# Patient Record
Sex: MALE | Race: WHITE | Employment: OTHER | URBAN - METROPOLITAN AREA
[De-identification: names, ages, dates, MRNs, and addresses within clinical notes are randomized per-mention and may not be internally consistent; named-entity substitution may affect disease eponyms.]

---

## 2017-10-16 ENCOUNTER — HOSPITAL ENCOUNTER (OUTPATIENT)
Dept: VASCULAR SURGERY | Age: 82
Discharge: HOME | End: 2017-10-16
Attending: INTERNAL MEDICINE
Payer: MEDICARE

## 2017-10-16 DIAGNOSIS — M79.89 LEG SWELLING: ICD-10-CM

## 2017-10-16 PROCEDURE — 93970 EXTREMITY STUDY: CPT

## 2017-10-16 NOTE — PROCEDURES
[address]  *** FINAL REPORT ***    Name: Sarah Alejandra  MRN: [id number]    Outpatient  : 1927  HIS Order #: [id number]  [id number] [facility] Visit #: [id number]  Date: 16 Oct 2017    TYPE OF TEST: Peripheral Venous Testing    REASON FOR TEST  Limb swelling    Right Leg:-  Deep venous thrombosis:           No  Superficial venous thrombosis:    No  Deep venous insufficiency:        Not examined  Superficial venous insufficiency: Not examined    Left Leg:-  Deep venous thrombosis:           No  Superficial venous thrombosis:    No  Deep venous insufficiency:        Not examined  Superficial venous insufficiency: Not examined      INTERPRETATION/FINDINGS  PROCEDURE:  Color duplex ultrasound imaging of lower extremity veins. FINDINGS:       Right: The common femoral, deep femoral, femoral, popliteal,  posterior tibial, peroneal, and great saphenous are patent and without   evidence of thrombus;  each is fully compressible and there is no  narrowing of the flow channel on color Doppler imaging. Phasic flow  is observed in the common femoral vein. Left:   The common femoral, deep femoral, femoral, popliteal,  posterior tibial, peroneal, and great saphenous are patent and without   evidence of thrombus;  each is fully compressible and there is no  narrowing of the flow channel on color Doppler imaging. Phasic flow  is observed in the common femoral vein. IMPRESSION:  No evidence of right or left lower extremity vein  thrombosis. ADDITIONAL COMMENTS    I have personally reviewed the data relevant to the interpretation of  this  study.     TECHNOLOGIST: LAYA Calle, RCS  Signed: 10/16/2017 05:09 PM    PHYSICIAN: Gume Ferreira MD  Signed: 10/17/2017 11:54 AM

## 2017-12-06 ENCOUNTER — HOSPITAL ENCOUNTER (INPATIENT)
Age: 82
LOS: 1 days | Discharge: NURSING, REHAB OR LONG-TERM CARE | DRG: 057 | End: 2017-12-09
Attending: EMERGENCY MEDICINE | Admitting: INTERNAL MEDICINE
Payer: MEDICARE

## 2017-12-06 ENCOUNTER — APPOINTMENT (OUTPATIENT)
Dept: CT IMAGING | Age: 82
DRG: 057 | End: 2017-12-06
Attending: EMERGENCY MEDICINE
Payer: MEDICARE

## 2017-12-06 DIAGNOSIS — G20 PRIMARY PARKINSONISM (HCC): ICD-10-CM

## 2017-12-06 DIAGNOSIS — R40.4 TRANSIENT ALTERATION OF AWARENESS: Primary | ICD-10-CM

## 2017-12-06 DIAGNOSIS — R41.89 UNRESPONSIVE EPISODE: ICD-10-CM

## 2017-12-06 LAB
ALBUMIN SERPL-MCNC: 3.5 G/DL (ref 3.5–5)
ALBUMIN/GLOB SERPL: 1 {RATIO} (ref 1.1–2.2)
ALP SERPL-CCNC: 100 U/L (ref 45–117)
ALT SERPL-CCNC: 25 U/L (ref 12–78)
ANION GAP SERPL CALC-SCNC: 7 MMOL/L (ref 5–15)
APPEARANCE UR: ABNORMAL
AST SERPL-CCNC: 25 U/L (ref 15–37)
ATRIAL RATE: 68 BPM
BACTERIA URNS QL MICRO: NEGATIVE /HPF
BASOPHILS # BLD: 0 K/UL (ref 0–0.1)
BASOPHILS NFR BLD: 0 % (ref 0–1)
BILIRUB SERPL-MCNC: 0.5 MG/DL (ref 0.2–1)
BILIRUB UR QL: NEGATIVE
BUN SERPL-MCNC: 22 MG/DL (ref 6–20)
BUN/CREAT SERPL: 18 (ref 12–20)
CALCIUM SERPL-MCNC: 9.2 MG/DL (ref 8.5–10.1)
CALCULATED P AXIS, ECG09: 38 DEGREES
CALCULATED R AXIS, ECG10: -19 DEGREES
CALCULATED T AXIS, ECG11: 29 DEGREES
CHLORIDE SERPL-SCNC: 99 MMOL/L (ref 97–108)
CO2 SERPL-SCNC: 28 MMOL/L (ref 21–32)
COLOR UR: ABNORMAL
CREAT SERPL-MCNC: 1.22 MG/DL (ref 0.7–1.3)
DIAGNOSIS, 93000: NORMAL
EOSINOPHIL # BLD: 0.1 K/UL (ref 0–0.4)
EOSINOPHIL NFR BLD: 1 % (ref 0–7)
EPITH CASTS URNS QL MICRO: ABNORMAL /LPF
ERYTHROCYTE [DISTWIDTH] IN BLOOD BY AUTOMATED COUNT: 13.7 % (ref 11.5–14.5)
GLOBULIN SER CALC-MCNC: 3.6 G/DL (ref 2–4)
GLUCOSE SERPL-MCNC: 104 MG/DL (ref 65–100)
GLUCOSE UR STRIP.AUTO-MCNC: NEGATIVE MG/DL
HCT VFR BLD AUTO: 42.3 % (ref 36.6–50.3)
HGB BLD-MCNC: 14.4 G/DL (ref 12.1–17)
HGB UR QL STRIP: NEGATIVE
KETONES UR QL STRIP.AUTO: NEGATIVE MG/DL
LEUKOCYTE ESTERASE UR QL STRIP.AUTO: NEGATIVE
LYMPHOCYTES # BLD: 3.1 K/UL (ref 0.8–3.5)
LYMPHOCYTES NFR BLD: 30 % (ref 12–49)
MCH RBC QN AUTO: 33.6 PG (ref 26–34)
MCHC RBC AUTO-ENTMCNC: 34 G/DL (ref 30–36.5)
MCV RBC AUTO: 98.8 FL (ref 80–99)
MONOCYTES # BLD: 0.6 K/UL (ref 0–1)
MONOCYTES NFR BLD: 6 % (ref 5–13)
NEUTS SEG # BLD: 6.4 K/UL (ref 1.8–8)
NEUTS SEG NFR BLD: 63 % (ref 32–75)
NITRITE UR QL STRIP.AUTO: NEGATIVE
P-R INTERVAL, ECG05: 158 MS
PH UR STRIP: 7 [PH] (ref 5–8)
PLATELET # BLD AUTO: 207 K/UL (ref 150–400)
POTASSIUM SERPL-SCNC: 4.4 MMOL/L (ref 3.5–5.1)
PROT SERPL-MCNC: 7.1 G/DL (ref 6.4–8.2)
PROT UR STRIP-MCNC: 30 MG/DL
Q-T INTERVAL, ECG07: 412 MS
QRS DURATION, ECG06: 90 MS
QTC CALCULATION (BEZET), ECG08: 438 MS
RBC # BLD AUTO: 4.28 M/UL (ref 4.1–5.7)
RBC #/AREA URNS HPF: ABNORMAL /HPF (ref 0–5)
SODIUM SERPL-SCNC: 134 MMOL/L (ref 136–145)
SP GR UR REFRACTOMETRY: 1.02 (ref 1–1.03)
TROPONIN I SERPL-MCNC: <0.04 NG/ML
TROPONIN I SERPL-MCNC: <0.04 NG/ML
UROBILINOGEN UR QL STRIP.AUTO: 0.2 EU/DL (ref 0.2–1)
VENTRICULAR RATE, ECG03: 68 BPM
WBC # BLD AUTO: 10.1 K/UL (ref 4.1–11.1)
WBC URNS QL MICRO: ABNORMAL /HPF (ref 0–4)

## 2017-12-06 PROCEDURE — 81001 URINALYSIS AUTO W/SCOPE: CPT | Performed by: EMERGENCY MEDICINE

## 2017-12-06 PROCEDURE — 36415 COLL VENOUS BLD VENIPUNCTURE: CPT | Performed by: EMERGENCY MEDICINE

## 2017-12-06 PROCEDURE — 84484 ASSAY OF TROPONIN QUANT: CPT | Performed by: EMERGENCY MEDICINE

## 2017-12-06 PROCEDURE — 96360 HYDRATION IV INFUSION INIT: CPT

## 2017-12-06 PROCEDURE — 85025 COMPLETE CBC W/AUTO DIFF WBC: CPT | Performed by: EMERGENCY MEDICINE

## 2017-12-06 PROCEDURE — 80053 COMPREHEN METABOLIC PANEL: CPT | Performed by: EMERGENCY MEDICINE

## 2017-12-06 PROCEDURE — 74011250636 HC RX REV CODE- 250/636: Performed by: INTERNAL MEDICINE

## 2017-12-06 PROCEDURE — 70450 CT HEAD/BRAIN W/O DYE: CPT

## 2017-12-06 PROCEDURE — 99218 HC RM OBSERVATION: CPT

## 2017-12-06 PROCEDURE — 99285 EMERGENCY DEPT VISIT HI MDM: CPT

## 2017-12-06 PROCEDURE — 93005 ELECTROCARDIOGRAM TRACING: CPT

## 2017-12-06 PROCEDURE — 93306 TTE W/DOPPLER COMPLETE: CPT

## 2017-12-06 PROCEDURE — 74011000250 HC RX REV CODE- 250: Performed by: INTERNAL MEDICINE

## 2017-12-06 PROCEDURE — 74011250637 HC RX REV CODE- 250/637: Performed by: INTERNAL MEDICINE

## 2017-12-06 PROCEDURE — 96361 HYDRATE IV INFUSION ADD-ON: CPT

## 2017-12-06 PROCEDURE — 95816 EEG AWAKE AND DROWSY: CPT | Performed by: INTERNAL MEDICINE

## 2017-12-06 RX ORDER — THERA TABS 400 MCG
1 TAB ORAL DAILY
Status: DISCONTINUED | OUTPATIENT
Start: 2017-12-07 | End: 2017-12-09 | Stop reason: HOSPADM

## 2017-12-06 RX ORDER — ACETAMINOPHEN 325 MG/1
650 TABLET ORAL
Status: DISCONTINUED | OUTPATIENT
Start: 2017-12-06 | End: 2017-12-09 | Stop reason: HOSPADM

## 2017-12-06 RX ORDER — POLYETHYLENE GLYCOL 3350 17 G/17G
17 POWDER, FOR SOLUTION ORAL
Status: DISCONTINUED | OUTPATIENT
Start: 2017-12-06 | End: 2017-12-09 | Stop reason: HOSPADM

## 2017-12-06 RX ORDER — ACETAMINOPHEN 325 MG/1
325 TABLET ORAL DAILY
Status: DISCONTINUED | OUTPATIENT
Start: 2017-12-07 | End: 2017-12-09 | Stop reason: HOSPADM

## 2017-12-06 RX ORDER — TIMOLOL MALEATE 5 MG/ML
1 SOLUTION/ DROPS OPHTHALMIC DAILY
Status: DISCONTINUED | OUTPATIENT
Start: 2017-12-07 | End: 2017-12-09 | Stop reason: HOSPADM

## 2017-12-06 RX ORDER — BUPROPION HYDROCHLORIDE 100 MG/1
100 TABLET, EXTENDED RELEASE ORAL 2 TIMES DAILY
Status: DISCONTINUED | OUTPATIENT
Start: 2017-12-06 | End: 2017-12-07

## 2017-12-06 RX ORDER — KETOCONAZOLE 20 MG/G
CREAM TOPICAL
COMMUNITY

## 2017-12-06 RX ORDER — METOPROLOL SUCCINATE 25 MG/1
25 TABLET, EXTENDED RELEASE ORAL DAILY
COMMUNITY

## 2017-12-06 RX ORDER — AZELASTINE 1 MG/ML
1 SPRAY, METERED NASAL 2 TIMES DAILY
Status: DISCONTINUED | OUTPATIENT
Start: 2017-12-06 | End: 2017-12-09 | Stop reason: HOSPADM

## 2017-12-06 RX ORDER — SODIUM CHLORIDE 9 MG/ML
100 INJECTION, SOLUTION INTRAVENOUS CONTINUOUS
Status: DISCONTINUED | OUTPATIENT
Start: 2017-12-06 | End: 2017-12-07

## 2017-12-06 RX ORDER — CLOPIDOGREL BISULFATE 75 MG/1
75 TABLET ORAL DAILY
Status: DISCONTINUED | OUTPATIENT
Start: 2017-12-07 | End: 2017-12-09 | Stop reason: HOSPADM

## 2017-12-06 RX ORDER — POLYETHYLENE GLYCOL 3350 17 G/17G
17 POWDER, FOR SOLUTION ORAL EVERY OTHER DAY
COMMUNITY
End: 2017-12-09

## 2017-12-06 RX ORDER — SODIUM CHLORIDE 0.9 % (FLUSH) 0.9 %
5-10 SYRINGE (ML) INJECTION AS NEEDED
Status: DISCONTINUED | OUTPATIENT
Start: 2017-12-06 | End: 2017-12-09 | Stop reason: HOSPADM

## 2017-12-06 RX ORDER — SODIUM CHLORIDE 0.9 % (FLUSH) 0.9 %
5-10 SYRINGE (ML) INJECTION EVERY 8 HOURS
Status: DISCONTINUED | OUTPATIENT
Start: 2017-12-06 | End: 2017-12-09 | Stop reason: HOSPADM

## 2017-12-06 RX ORDER — LATANOPROST 50 UG/ML
1 SOLUTION/ DROPS OPHTHALMIC
Status: DISCONTINUED | OUTPATIENT
Start: 2017-12-06 | End: 2017-12-09 | Stop reason: HOSPADM

## 2017-12-06 RX ORDER — METOPROLOL TARTRATE 25 MG/1
25 TABLET, FILM COATED ORAL
Status: DISCONTINUED | OUTPATIENT
Start: 2017-12-06 | End: 2017-12-06

## 2017-12-06 RX ORDER — LOPERAMIDE HYDROCHLORIDE 2 MG/1
2 CAPSULE ORAL
COMMUNITY

## 2017-12-06 RX ORDER — BUPROPION HYDROCHLORIDE 150 MG/1
150 TABLET ORAL
Status: DISCONTINUED | OUTPATIENT
Start: 2017-12-07 | End: 2017-12-06

## 2017-12-06 RX ORDER — HYDROCORTISONE 25 MG/G
CREAM TOPICAL
COMMUNITY

## 2017-12-06 RX ORDER — BUPROPION HYDROCHLORIDE 100 MG/1
100 TABLET, EXTENDED RELEASE ORAL 2 TIMES DAILY
COMMUNITY

## 2017-12-06 RX ORDER — GUAIFENESIN 600 MG/1
600 TABLET, EXTENDED RELEASE ORAL
Status: DISCONTINUED | OUTPATIENT
Start: 2017-12-06 | End: 2017-12-09 | Stop reason: HOSPADM

## 2017-12-06 RX ORDER — NITROGLYCERIN 0.4 MG/1
0.4 TABLET SUBLINGUAL
Status: DISCONTINUED | OUTPATIENT
Start: 2017-12-06 | End: 2017-12-09 | Stop reason: HOSPADM

## 2017-12-06 RX ORDER — METOPROLOL SUCCINATE 25 MG/1
25 TABLET, EXTENDED RELEASE ORAL
Status: DISCONTINUED | OUTPATIENT
Start: 2017-12-06 | End: 2017-12-09 | Stop reason: HOSPADM

## 2017-12-06 RX ORDER — ADHESIVE BANDAGE
30 BANDAGE TOPICAL DAILY PRN
COMMUNITY

## 2017-12-06 RX ORDER — METOPROLOL SUCCINATE 25 MG/1
25 TABLET, EXTENDED RELEASE ORAL DAILY
Status: DISCONTINUED | OUTPATIENT
Start: 2017-12-07 | End: 2017-12-06

## 2017-12-06 RX ORDER — FAMOTIDINE 20 MG/1
20 TABLET, FILM COATED ORAL
COMMUNITY

## 2017-12-06 RX ORDER — DOXAZOSIN 1 MG/1
1 TABLET ORAL DAILY
Status: DISCONTINUED | OUTPATIENT
Start: 2017-12-07 | End: 2017-12-09 | Stop reason: HOSPADM

## 2017-12-06 RX ADMIN — METOPROLOL SUCCINATE 25 MG: 25 TABLET, EXTENDED RELEASE ORAL at 22:31

## 2017-12-06 RX ADMIN — Medication 10 ML: at 22:33

## 2017-12-06 RX ADMIN — SODIUM CHLORIDE 100 ML/HR: 900 INJECTION, SOLUTION INTRAVENOUS at 16:29

## 2017-12-06 RX ADMIN — LATANOPROST 1 DROP: 50 SOLUTION OPHTHALMIC at 22:31

## 2017-12-06 RX ADMIN — BUPROPION HYDROCHLORIDE 100 MG: 100 TABLET, FILM COATED, EXTENDED RELEASE ORAL at 22:31

## 2017-12-06 NOTE — IP AVS SNAPSHOT
[address] [facility] Amanuel Caro 13 
[phone] Patient: Irina Olvera 
MRN: [id number] FBP:3/21/4381 About your hospitalization You were admitted on:  December 6, 2017 You last received care in the:  [facility] 6S NEURO-SCI TELE You were discharged on:  December 9, 2017 Why you were hospitalized Your primary diagnosis was:  Not on File Your diagnoses also included: Altered Mental Status, Htn (Hypertension), Dnr (Do Not Resuscitate), Cad (Coronary Artery Disease), Unresponsive Episode, Primary Parkinsonism (Hcc), Syncope Things You Need To Do (next 8 weeks) Follow up with Emanuel Hernandez MD  
  
Phone:  [phone] Where:  [address] [phone] Discharge Orders None A check cathy indicates which time of day the medication should be taken. My Medications STOP taking these medications OTHER  
   
  
  
TAKE these medications as instructed Instructions Each Dose to Equal  
 Morning Noon Evening Bedtime  
 acetaminophen 325 mg tablet Commonly known as:  TYLENOL Your last dose was: Your next dose is: Take 1,000 mg by mouth every eight (8) hours as needed. 1000 mg  
    
   
   
   
  
 amoxicillin 500 mg capsule Commonly known as:  AMOXIL Your last dose was: Your next dose is: Take 2,000 mg by mouth as needed. 1 hour prior to dental work  
 2000 mg Azelastine 0.15 % (205.5 mcg) nasal spray Commonly known as:  ASTEPRO Your last dose was: Your next dose is:    
   
   
 1 Spray by Right Nostril route two (2) times daily as needed. 1 Spray buPROPion  mg SR tablet Commonly known as:  Forrestine [place] Your last dose was: Your next dose is: Take 100 mg by mouth two (2) times a day.   
 100 mg  
    
   
   
   
  
 carbidopa-levodopa  mg per tablet Commonly known as:  SINEMET Your last dose was: Your next dose is: Take 1 Tab by mouth three (3) times daily. 1 Tab CENTRUM SILVER PO Your last dose was: Your next dose is: Take 1 Tab by mouth daily. 1 Tab  
    
   
   
   
  
 clopidogrel 75 mg Tab Commonly known as:  PLAVIX Your last dose was: Your next dose is: Take 1 Tab by mouth daily. 75 mg  
    
   
   
   
  
 diclofenac 1 % Gel Commonly known as:  VOLTAREN Your last dose was: Your next dose is:    
   
   
 Apply 4 g to affected area four (4) times daily as needed. Apply to both shoulders 4 g  
    
   
   
   
  
 docusate sodium 100 mg capsule Commonly known as:  Flor Mehta Your last dose was: Your next dose is: Take 1 Cap by mouth two (2) times a day for 90 days. 100 mg  
    
   
   
   
  
 doxazosin 1 mg tablet Commonly known as:  CARDURA Your last dose was: Your next dose is: Take 1 mg by mouth daily. 1 mg  
    
   
   
   
  
 hydrocortisone 2.5 % topical cream  
Commonly known as:  HYTONE Your last dose was: Your next dose is:    
   
   
 Apply  to affected area daily as needed. use thin layer  
     
   
   
   
  
 ketoconazole 2 % topical cream  
Commonly known as:  NIZORAL Your last dose was: Your next dose is:    
   
   
 Apply  to affected area two (2) times daily as needed for Skin Irritation. L-Methylfolate-B30-Suignjtghm tablet Commonly known as:  Orysia Mangle Your last dose was: Your next dose is: Take 1 Tab by mouth daily. 1 Tab LIMBREL 500 mg Cap Generic drug:  baicalin-catechin Your last dose was: Your next dose is: Take 1 Tab by mouth two (2) times a day. 1 Tab loperamide 2 mg capsule Commonly known as:  IMODIUM Your last dose was: Your next dose is: Take 2 mg by mouth four (4) times daily as needed for Diarrhea.  
 2 mg MIRALAX 17 gram/dose powder Generic drug:  polyethylene glycol Your last dose was: Your next dose is: Take 17 g by mouth daily as needed. 17 g MUCINEX 1,200 mg Ta12 ER tablet Generic drug:  guaiFENesin Your last dose was: Your next dose is: Take 600 mg by mouth every twelve (12) hours as needed. 600 mg MYRBETRIQ 25 mg ER tablet Generic drug:  mirabegron ER Your last dose was: Your next dose is: Take 25 mg by mouth nightly. 25 mg  
    
   
   
   
  
 nitroglycerin 0.4 mg SL tablet Commonly known as:  NITROSTAT Your last dose was: Your next dose is:    
   
   
 by SubLINGual route every five (5) minutes as needed. PEPCID 20 mg tablet Generic drug:  famotidine Your last dose was: Your next dose is: Take 20 mg by mouth two (2) times daily as needed. 20 mg PANDA MILK OF MAGNESIA 400 mg/5 mL suspension Generic drug:  magnesium hydroxide Your last dose was: Your next dose is: Take 30 mL by mouth daily as needed for Constipation. 30 mL  
    
   
   
   
  
 timolol 0.5 % ophthalmic solution Commonly known as:  TIMOPTIC Your last dose was: Your next dose is:    
   
   
 Administer 1 Drop to left eye daily. 1 Drop TOPROL XL 25 mg XL tablet Generic drug:  metoprolol succinate Your last dose was: Your next dose is: Take 25 mg by mouth daily. 25 mg  
    
   
   
   
  
 XALATAN 0.005 % ophthalmic solution Generic drug:  latanoprost  
   
 Your last dose was: Your next dose is:    
   
   
 Administer 1 Drop to left eye nightly. 1 Drop Where to Get Your Medications Information on where to get these meds will be given to you by the nurse or doctor. ! Ask your nurse or doctor about these medications  
  carbidopa-levodopa  mg per tablet  
 docusate sodium 100 mg capsule Discharge Instructions Patient Discharge Instructions Sandra Tan / [id number] : 1927 Admitted 2017 Discharged: 2017 Take Home Medications · It is important that you take the medication exactly as they are prescribed. · Keep your medication in the bottles provided by the pharmacist and keep a list of the medication names, dosages, and times to be taken in your wallet. · Do not take other medications without consulting your doctor. What to do at [facility] Recommended diet: Cardiac Diet, Recommended activity: Activity as tolerated, PT/OT If you experience any of the following symptoms syncope, please follow up with Dr. Nate Perez. Follow-up Appointments Procedures  FOLLOW UP VISIT Appointment in: One Month As needed As needed Standing Status:   Standing Number of Occurrences:   1 Order Specific Question:   Appointment in Answer:   One Month Information obtained by : 
I understand that if any problems occur once I am at home I am to contact my physician. I understand and acknowledge receipt of the instructions indicated above. Physician's or R.N.'s Signature                                                                  Date/Time Patient or Representative Signature                                                          Date/Time Introducing [facility]! Dear Khalif Mnea: Thank you for requesting a "[employer] account. Our records indicate that you already have an active "[employer] account. You can access your account anytime at [email or web address] Did you know that you can access your hospital and ER discharge instructions at any time in "[employer]? You can also review all of your test results from your hospital stay or ER visit. Additional Information If you have questions, please visit the Frequently Asked Questions section of the "[employer] website at [email or web address] Remember, "[employer] is NOT to be used for urgent needs. For medical emergencies, dial 911. Now available from your iPhone and Android! Providers Seen During Your Hospitalization Provider Specialty Primary office phone Clelia Sacks, MD Emergency Medicine [phone] Rosalva Marie MD Internal Medicine [phone] Your Primary Care Physician (PCP) Primary Care Physician Office Phone Office Fax [address] [phone] You are allergic to the following Allergen Reactions Latex Rash Actonel (Risedronate) Unknown (comments) Augmentin (Amoxicillin-Pot Clavulanate) Diarrhea Azithromycin Hives Crestor (Rosuvastatin) Myalgia Lescol (Fluvastatin) Myalgia Micardis (Telmisartan) Unknown (comments) Morphine Nausea and Vomiting Prolia (Denosumab) Other (comments) Dental difficulties Sulfa (Sulfonamide Antibiotics) Unknown (comments) Zocor (Simvastatin) Myalgia Recent Documentation Height Weight BMI Smoking Status 1.676 m 83.9 kg 29.84 kg/m2 Former Smoker Emergency Contacts Name Discharge Info Relation Home Work Mobile [address] CAREGIVER [3] Child [2] [phone] KimberlynJohn DISCHARGE CAREGIVER [3] Child [2] [phone] CAREGIVER [3] Girlfriend [18] [phone] Patient Belongings The following personal items are in your possession at time of discharge: 
  Dental Appliances: None  Visual Aid: Glasses, At home      Home Medications: Kept at bedside (verified by pharmacy)   Jewelry: None  Clothing: At bedside    Other Valuables: None Please provide this summary of care documentation to your next provider. Signatures-by signing, you are acknowledging that this After Visit Summary has been reviewed with you and you have received a copy. Patient Signature:  ____________________________________________________________ Date:  ____________________________________________________________  
  
[facility] Provider Signature:  ____________________________________________________________ Date:  ____________________________________________________________

## 2017-12-06 NOTE — ED TRIAGE NOTES
Pt arrives with altered mentation since daughter saw him on Monday. Pt has episode this morning after waking up where he \"zoned out\" in the bathroom when morning staff came in to check on pt. Pt is A&O x 3-4 upon arrival to ED.  per EMS.

## 2017-12-06 NOTE — CONSULTS
[address]   [address] [facility], [address] [place] [address]       Name:  Evorn Frankel   MR#:  [id number]   :  1927   Account #:  [de-identified]    Date of Consultation:  2017   Date of Adm:  2017       HISTORY OF PRESENT ILLNESS: This 20-year-old man recently   moved from assisted living into Healthcare at [facility]. He was sent to the ER for evaluation. He was found by staff slumped   over his Rollator in the bathroom around 8 this morning, with no   clothes on. Blood pressure was 70/50. He had altered mental status. He was then dressed, placed in a chair, felt well enough to have   breakfast. Family had arrived, and he was sent to the ER for   evaluation. He has been stable from a blood pressure standpoint here. He is alert, has interactive speech, but family notes that he is not at his   normal baseline. There have apparently been no fevers or chills. No   urinary symptoms. Urinalysis was negative. He had a CT scan of the   head without contrast, there was no acute intracranial abnormality. Chest x-ray is pending. His other laboratories are unremarkable. There   is no chest pain, no shortness of breath, no orthopnea, PND or edema. There was apparently no actual syncope. Not clear why the blood   pressure was low at that point, and he is unable to give an adequate   history of the event. PAST MEDICAL HISTORY: Normally followed by Dr. Medina King from a   cardiac standpoint. There is a prior history of coronary bypass grafting. There are some issues chronically with thrombocytopenia. He has had   previous syncope and TIA, anxiety disorder, prior pneumonias, long-  standing peripheral neuropathy, overactive bladder, osteoporosis,   hyponatremia, hypertension, degenerative arthritis with bilateral knee   replacements and hip arthritis, glaucoma, gastroesophageal reflux, and   a bypass surgery was 22 years ago.  He has a murmur that Dr. Medina King is following, sounds like it is aortic stenosis, which will be described. CURRENT MEDICINES   1. Tylenol p.r.n.   2. Amoxicillin as needed for dental work. 3. Astepro nasal spray. 4. Limbrel twice daily. 5. Wellbutrin  twice a day. 6. Plavix 75 mg a day. 7. Voltaren topical gel. 8. Doxazosin 1 mg daily. 9. Pepcid 20 twice daily as needed. 10. Centrum Silver. 11. Mucinex. 12. Hytone topical cream p.r.n.   13. Hydrocortisone cream p.r.n. 14. Ketaconazole p.r.n.   15. Cerefolin tablet daily. 16. Xalatan eye drops. 17. Loperamide p.r.n. 18. Milk of Magnesia p.r.n.   19. Toprol XL 25 mg a day. 20. Myrbetriq 25 mg nightly. 21. Nitroglycerin 0.4 sublingual p.r.n.   22. MiraLax every other day. 23. Timoptic eye drops. ALLERGIES   1. LATEX. 2. ACTONEL. 3. AUGMENTIN. 4. AZITHROMYCIN. 5. CRESTOR. 6. LESCOL. 7. MICARDIS. 8. MORPHINE. [place]. 10. SULFA. 11. SIMVASTATIN. FAMILY HISTORY: Positive for organic heart disease, stroke and   diabetes. SOCIAL HISTORY: Retired , here with family. No   cigarettes, no alcohol. Again, recently went from assisted living into   Healthcare at [facility]. He is approaching is 89st   birthday. REVIEW OF SYSTEMS: Otherwise unobtainable. PHYSICAL EXAMINATION   GENERAL: He is now in no acute distress. Family indicates that he is   not at his baseline in terms of mentation, but his speech is coherent. He has clear memory deficit. VITAL SIGNS: Blood pressure 164/81, pulse 80 and regular, sinus   rhythm. NECK: Sitting up no JVD. Carotids without bruit. Thyroid not palpable. No adenopathy. LUNGS: Clear. HEART: Regular rate and rhythm with a 2/6 systolic ejection murmur at   the base. I do not hear an S2. No other murmurs. ABDOMEN: Soft, nontender, without masses or organomegaly. EXTREMITIES: Without edema. Pedal pulses are diminished. SKIN: Intact.    MUSCULOSKELETAL: No skeletal deformities. NEUROLOGIC: Nonfocal.    EKG: Normal sinus rhythm, old septal infarct. LABORATORY DATA: Urinalysis negative. Sodium 134, potassium   4.4, chloride 99, CO2 28, glucose 104, CO2 22, creatinine 1.22. Troponins are negative x2. Hemoglobin 14.4, hematocrit 42.3, white   count 10,100, platelets 773,073. DIAGNOSTIC DATA: CT of the head, no intracranial abnormality. PROBLEMS   1. Altered mental status, with hypotensive episode of unclear etiology. 2. Coronary artery disease, with prior coronary bypass grafting. No   overt evidence for an ongoing ischemic syndrome. 3. Systolic ejection murmur at the base, consistent with aortic stenosis,   may be severe. 4. Dementia. 5. Degenerative joint disease, with history of bilateral knee   replacements and chronic hip pain. 6. History of hypertension. 7. Peripheral neuropathy. PLAN/RECOMMENDATIONS: Agree with need for admission and   observation. Get an updated echo, serial enzymes and EKGs. No   change in medical regimen at this point, and no aggressive cardiac   measures. We will follow with you.         Nino Louis MD      [address] [place] [id number] / [facility]   D:  12/06/2017   16:06   T:  12/06/2017   16:32   Job #:  [id number]

## 2017-12-06 NOTE — PROGRESS NOTES
Admission Medication Reconciliation:    Information obtained from: Medication list from [facility]    Significant PMH/Disease States:   Past Medical History:   Diagnosis Date    Arthritis     spine, hip    CAD (coronary artery disease)     bypass surgery 22 years ago,     Chronic pain     right hip    DNR (do not resuscitate) 2014    GERD (gastroesophageal reflux disease)     Glaucoma     Hip arthritis 2011    HTN (hypertension) 2011    Hyponatremia 2011    Osteoporosis     Overactive bladder     Peripheral neuropathy 2011    Pneumonia     Psychiatric disorder     anxiety    Syncope 2011    Thrombocytopenia ([facility] [address] 2013    Unspecified adverse effect of anesthesia     PT REQUESTS SPINAL ANESTHESIA , TO BE DISCUSSED WITH ANESTHESIOLOGIST DOS       Chief Complaint for this Admission:  AMS    Allergies:  Latex; Actonel [risedronate]; Augmentin [amoxicillin-pot clavulanate]; Azithromycin; Crestor [rosuvastatin]; Lescol [fluvastatin]; Micardis [telmisartan]; Morphine; Prolia [denosumab]; Sulfa (sulfonamide antibiotics); and Zocor [simvastatin]    Prior to Admission Medications:   Prior to Admission Medications   Prescriptions Last Dose Informant Patient Reported? Taking? Azelastine (ASTEPRO) 0.15 % (205.5 mcg) nasal spray   Yes Yes   Si Spray by Right Nostril route two (2) times daily as needed. FOLIC ACID/MULTIVIT-MIN/LUTEIN (CENTRUM SILVER PO) 2017 at Unknown time  Yes Yes   Sig: Take 1 Tab by mouth daily. L-Methylfolate-J94-Dnxwiapcla (CEREFOLIN) tablet 2017 at Unknown time  Yes Yes   Sig: Take 1 Tab by mouth daily. acetaminophen (TYLENOL) 325 mg tablet   Yes Yes   Sig: Take 1,000 mg by mouth every eight (8) hours as needed. amoxicillin (AMOXIL) 500 mg capsule   Yes No   Sig: Take 2,000 mg by mouth as needed.  1 hour prior to dental work    baicalin-catechin (LIMBREL) 500 mg cap 2017 at Unknown time  Yes Yes Sig: Take 1 Tab by mouth two (2) times a day. buPROPion SR (WELLBUTRIN SR) 100 mg SR tablet 12/6/2017 at Unknown time  Yes Yes   Sig: Take 100 mg by mouth two (2) times a day. clopidogrel (PLAVIX) 75 mg tablet 12/6/2017 at Unknown time  No Yes   Sig: Take 1 Tab by mouth daily. diclofenac (VOLTAREN) 1 % topical gel   No Yes   Sig: Apply 4 g to affected area four (4) times daily as needed. Apply to both shoulders   doxazosin (CARDURA) 1 mg tablet 12/6/2017 at Unknown time  Yes Yes   Sig: Take 1 mg by mouth daily. famotidine (PEPCID) 20 mg tablet   Yes Yes   Sig: Take 20 mg by mouth two (2) times daily as needed. guaiFENesin (MUCINEX) 1,200 mg Ta12 ER tablet   Yes Yes   Sig: Take 600 mg by mouth every twelve (12) hours as needed. hydrocortisone (HYTONE) 2.5 % topical cream   Yes Yes   Sig: Apply  to affected area daily as needed. use thin layer   ketoconazole (NIZORAL) 2 % topical cream   Yes Yes   Sig: Apply  to affected area two (2) times daily as needed for Skin Irritation. latanoprost (XALATAN) 0.005 % ophthalmic solution 12/5/2017 at Unknown time  Yes Yes   Sig: Administer 1 Drop to left eye nightly. loperamide (IMODIUM) 2 mg capsule   Yes Yes   Sig: Take 2 mg by mouth four (4) times daily as needed for Diarrhea.   magnesium hydroxide (PANDA MILK OF MAGNESIA) 400 mg/5 mL suspension   Yes Yes   Sig: Take 30 mL by mouth daily as needed for Constipation. metoprolol succinate (TOPROL XL) 25 mg XL tablet 12/6/2017 at Unknown time  Yes Yes   Sig: Take 25 mg by mouth daily. mirabegron ER (MYRBETRIQ) 25 mg ER tablet 12/5/2017 at Unknown time  Yes Yes   Sig: Take 25 mg by mouth nightly. nitroglycerin (NITROSTAT) 0.4 mg SL tablet   Yes Yes   Sig: by SubLINGual route every five (5) minutes as needed. polyethylene glycol (MIRALAX) 17 gram packet 12/6/2017 at Unknown time  Yes Yes   Sig: Take 17 g by mouth every other day.    polyethylene glycol (MIRALAX) 17 gram/dose powder   Yes Yes   Sig: Take 17 g by mouth daily as needed. timolol (TIMOPTIC) 0.5 % ophthalmic solution 12/6/2017 at Unknown time  Yes Yes   Sig: Administer 1 Drop to left eye daily. Facility-Administered Medications: None         Comments/Recommendations:   1. Changed bupropion XL 150mg daily to SR 150mg BID  2. Removed lidocaine, APAP CR  3. Changed APAP 325 to 1000mg Q8h PRN  4. Changed guaifenesin 1200mg QHS to 600mg BID PRN  5. Changed metoprolol tartrate to metoprolol succinate  6.  Added Miralax every other day, Cerefolin, loperamide, pepcid, milk of mag, ketoconazole, hydrocortisone

## 2017-12-06 NOTE — IP AVS SNAPSHOT
[address] 
[phone] Patient: Milton Holter 
MRN: [id number] CNU:3/71/0536 My Medications STOP taking these medications OTHER  
   
  
  
TAKE these medications as instructed Instructions Each Dose to Equal  
 Morning Noon Evening Bedtime  
 acetaminophen 325 mg tablet Commonly known as:  TYLENOL Your last dose was: Your next dose is: Take 1,000 mg by mouth every eight (8) hours as needed. 1000 mg  
    
   
   
   
  
 amoxicillin 500 mg capsule Commonly known as:  AMOXIL Your last dose was: Your next dose is: Take 2,000 mg by mouth as needed. 1 hour prior to dental work  
 2000 mg Azelastine 0.15 % (205.5 mcg) nasal spray Commonly known as:  ASTEPRO Your last dose was: Your next dose is:    
   
   
 1 Spray by Right Nostril route two (2) times daily as needed. 1 Spray buPROPion  mg SR tablet Commonly known as:  Solange Peres Your last dose was: Your next dose is: Take 100 mg by mouth two (2) times a day. 100 mg  
    
   
   
   
  
 carbidopa-levodopa  mg per tablet Commonly known as:  SINEMET Your last dose was: Your next dose is: Take 1 Tab by mouth three (3) times daily. 1 Tab CENTRUM SILVER PO Your last dose was: Your next dose is: Take 1 Tab by mouth daily. 1 Tab  
    
   
   
   
  
 clopidogrel 75 mg Tab Commonly known as:  PLAVIX Your last dose was: Your next dose is: Take 1 Tab by mouth daily. 75 mg  
    
   
   
   
  
 diclofenac 1 % Gel Commonly known as:  VOLTAREN Your last dose was: Your next dose is:    
   
   
 Apply 4 g to affected area four (4) times daily as needed. Apply to both shoulders 4 g  
    
   
   
   
  
 docusate sodium 100 mg capsule Commonly known as:  Jam Mccurdy Your last dose was: Your next dose is: Take 1 Cap by mouth two (2) times a day for 90 days. 100 mg  
    
   
   
   
  
 doxazosin 1 mg tablet Commonly known as:  CARDURA Your last dose was: Your next dose is: Take 1 mg by mouth daily. 1 mg  
    
   
   
   
  
 hydrocortisone 2.5 % topical cream  
Commonly known as:  HYTONE Your last dose was: Your next dose is:    
   
   
 Apply  to affected area daily as needed. use thin layer  
     
   
   
   
  
 ketoconazole 2 % topical cream  
Commonly known as:  NIZORAL Your last dose was: Your next dose is:    
   
   
 Apply  to affected area two (2) times daily as needed for Skin Irritation. L-Methylfolate-I39-Bgrbxghfrx tablet Commonly known as:  Mary Band Your last dose was: Your next dose is: Take 1 Tab by mouth daily. 1 Tab LIMBREL 500 mg Cap Generic drug:  baicalin-catechin Your last dose was: Your next dose is: Take 1 Tab by mouth two (2) times a day. 1 Tab  
    
   
   
   
  
 loperamide 2 mg capsule Commonly known as:  IMODIUM Your last dose was: Your next dose is: Take 2 mg by mouth four (4) times daily as needed for Diarrhea.  
 2 mg MIRALAX 17 gram/dose powder Generic drug:  polyethylene glycol Your last dose was: Your next dose is: Take 17 g by mouth daily as needed. 17 g MUCINEX 1,200 mg Ta12 ER tablet Generic drug:  guaiFENesin Your last dose was: Your next dose is: Take 600 mg by mouth every twelve (12) hours as needed. 600 mg MYRBETRIQ 25 mg ER tablet Generic drug:  mirabegron ER  
   
 Your last dose was: Your next dose is: Take 25 mg by mouth nightly. 25 mg  
    
   
   
   
  
 nitroglycerin 0.4 mg SL tablet Commonly known as:  NITROSTAT Your last dose was: Your next dose is:    
   
   
 by SubLINGual route every five (5) minutes as needed. PEPCID 20 mg tablet Generic drug:  famotidine Your last dose was: Your next dose is: Take 20 mg by mouth two (2) times daily as needed. 20 mg PANDA MILK OF MAGNESIA 400 mg/5 mL suspension Generic drug:  magnesium hydroxide Your last dose was: Your next dose is: Take 30 mL by mouth daily as needed for Constipation. 30 mL  
    
   
   
   
  
 timolol 0.5 % ophthalmic solution Commonly known as:  TIMOPTIC Your last dose was: Your next dose is:    
   
   
 Administer 1 Drop to left eye daily. 1 Drop TOPROL XL 25 mg XL tablet Generic drug:  metoprolol succinate Your last dose was: Your next dose is: Take 25 mg by mouth daily. 25 mg  
    
   
   
   
  
 XALATAN 0.005 % ophthalmic solution Generic drug:  latanoprost  
   
Your last dose was: Your next dose is:    
   
   
 Administer 1 Drop to left eye nightly. 1 Drop Where to Get Your Medications Information on where to get these meds will be given to you by the nurse or doctor. ! Ask your nurse or doctor about these medications  
  carbidopa-levodopa  mg per tablet  
 docusate sodium 100 mg capsule

## 2017-12-06 NOTE — ED PROVIDER NOTES
HPI Comments: The patient is a 77-year-old male who presents to the emergency room via EMS having been found altered at his assisted living facility. The patient recalls rising from bed this morning end going into the bathroom around 0800. He was found by staff slumped over in his rollater with no clothes on. Blood pressure at that time was 70/50. He was dressed and placed in a chair. He then had breakfast and all of his morning meds. The events he recalls. Upon the arrival of his daughter later on in the morning EMS was activated to bring the patient to the emergency room for further evaluation. There is been no documented fevers. Patient has no complaints now and is currently back to baseline. Pt denies fevers, chills, night sweats, chest pain, pressure, SOB, FARLEY, PND, orthopnea, abdominal pain, n/v/d, melena, hematuria, dysuria, constipation, HA, dizziness, and recent fall. Past Medical History:  No date: Arthritis      Comment: spine, hip  No date: CAD (coronary artery disease)      Comment: bypass surgery 22 years ago, 1988  No date: Chronic pain      Comment: right hip  11/26/2014: DNR (do not resuscitate)      Comment: 11/26/14  No date: GERD (gastroesophageal reflux disease)  No date: Glaucoma  12/5/2011: Hip arthritis  9/13/2011: HTN (hypertension)  11/28/2011: Hyponatremia  No date: Osteoporosis  No date: Overactive bladder  9/13/2011: Peripheral neuropathy  No date: Pneumonia  No date: Psychiatric disorder      Comment: anxiety  9/13/2011: Syncope  11/6/2013: Thrombocytopenia ([facility] [address]  No date: Unspecified adverse effect of anesthesia      Comment: PT REQUESTS SPINAL ANESTHESIA , TO BE                DISCUSSED WITH ANESTHESIOLOGIST DOS    Past Surgical History:  No date: ABDOMEN SURGERY PROC UNLISTED      Comment: spleona, ventral, left inguinal repairs.   1988: CARDIAC SURG PROCEDURE UNLIST      Comment: CABG  11/16/09: ENDOSCOPY, COLON, DIAGNOSTIC      Comment: diverticulosis, f/u prn  No date: HX COLONOSCOPY  No date: HX CORONARY ARTERY BYPASS GRAFT  2010: HX GI      Comment: Ventral abd, herniorrhaphy with mesh, Lt IH   No date: HX HEENT      Comment: both eyes 2009, cataract surgery  No date: HX LUMBAR LAMINECTOMY      Comment: for spinal stenosis  No date: HX ORTHOPAEDIC      Comment: back ,BILATERAL knee replacement, shoulder  No date: HX ORTHOPAEDIC      Comment: right shoulder decompression  No date: HX ORTHOPAEDIC      Comment: RT HIP  No date: HX OTHER SURGICAL      Comment: right carotid endarterectomy  No date: HX TONSILLECTOMY    PCP:  Claudy Miller MD      Patient is a 80 y.o. male presenting with altered mental status. The history is provided by the patient. Altered mental status    Pertinent negatives include no confusion, no weakness, no agitation and no numbness. Past Medical History:   Diagnosis Date    Arthritis     spine, hip    CAD (coronary artery disease)     bypass surgery 22 years ago, 1988    Chronic pain     right hip    DNR (do not resuscitate) 11/26/2014 11/26/14    GERD (gastroesophageal reflux disease)     Glaucoma     Hip arthritis 12/5/2011    HTN (hypertension) 9/13/2011    Hyponatremia 11/28/2011    Osteoporosis     Overactive bladder     Peripheral neuropathy 9/13/2011    Pneumonia     Psychiatric disorder     anxiety    Syncope 9/13/2011    Thrombocytopenia ([facility] [address] 11/6/2013    Unspecified adverse effect of anesthesia     PT REQUESTS SPINAL ANESTHESIA , TO BE DISCUSSED WITH ANESTHESIOLOGIST DOS       Past Surgical History:   Procedure Laterality Date    ABDOMEN SURGERY PROC UNLISTED      spgellian, ventral, left inguinal repairs.     CARDIAC SURG PROCEDURE UNLIST  1988    CABG    ENDOSCOPY, COLON, DIAGNOSTIC  11/16/09    diverticulosis, f/u prn    HX COLONOSCOPY      HX CORONARY ARTERY BYPASS GRAFT      HX GI  2010    Ventral abd, herniorrhaphy with mesh, Lt IH     HX HEENT      both eyes 2009, cataract surgery    HX LUMBAR LAMINECTOMY      for spinal stenosis    HX ORTHOPAEDIC      back ,BILATERAL knee replacement, shoulder    HX ORTHOPAEDIC      right shoulder decompression    HX ORTHOPAEDIC      RT HIP    HX OTHER SURGICAL      right carotid endarterectomy    HX TONSILLECTOMY           Family History:   Problem Relation Age of Onset    Diabetes Brother     Heart Disease Brother     Heart Disease Father     Stroke Father        Social History     Social History    Marital status:      Spouse name: N/A    Number of children: N/A    Years of education: N/A     Occupational History    Not on file. Social History Main Topics    Smoking status: Former Smoker    Smokeless tobacco: Never Used    Alcohol use 0.5 oz/week     1 Glasses of wine per week      Comment: infrequent     Drug use: No    Sexual activity: Not on file     Other Topics Concern    Not on file     Social History Narrative         ALLERGIES: Latex; Actonel [risedronate]; Augmentin [amoxicillin-pot clavulanate]; Azithromycin; Crestor [rosuvastatin]; Lescol [fluvastatin]; Micardis [telmisartan]; Morphine; Prolia [denosumab]; Sulfa (sulfonamide antibiotics); and Zocor [simvastatin]    Review of Systems   Constitutional: Negative for activity change, appetite change, chills, diaphoresis, fatigue, fever and unexpected weight change. HENT: Negative for congestion, ear pain, rhinorrhea, sinus pressure, sore throat, tinnitus, trouble swallowing and voice change. Eyes: Negative for pain, discharge, redness and visual disturbance. Respiratory: Negative for apnea, cough, choking, chest tightness, shortness of breath, wheezing and stridor. Cardiovascular: Negative for chest pain, palpitations and leg swelling. Gastrointestinal: Negative for abdominal pain, constipation, nausea and vomiting. Endocrine: Negative for cold intolerance and heat intolerance.    Genitourinary: Negative for difficulty urinating, dysuria, flank pain, hematuria, testicular pain and urgency. Musculoskeletal: Negative for arthralgias, back pain, gait problem, joint swelling, myalgias, neck pain and neck stiffness. Skin: Negative for color change, pallor, rash and wound. Allergic/Immunologic: Negative for immunocompromised state. Neurological: Negative for dizziness, tremors, syncope, weakness, light-headedness, numbness and headaches. Transient loss of memory of events from 0800 until 0900   Hematological: Does not bruise/bleed easily. Psychiatric/Behavioral: Negative for agitation, confusion and suicidal ideas. Vitals:    12/06/17 1129 12/06/17 1130 12/06/17 1230 12/06/17 1300   BP: 164/81 164/81  117/59   Pulse: 70 79 70 70   Resp: 18 16 17 17   Temp: 98.4 °F (36.9 °C)      SpO2: 99% 99% 98% 95%   Weight: 77.1 kg (170 lb)      Height: 5' 6\" (1.676 m)               Physical Exam   Constitutional: He is oriented to person, place, and time. He appears well-developed and well-nourished. No distress. HENT:   Head: Atraumatic. Nose: Nose normal.   Mouth/Throat: No oropharyngeal exudate. Eyes: Conjunctivae and EOM are normal. Right eye exhibits no discharge. Left eye exhibits no discharge. No scleral icterus. Neck: Normal range of motion. Neck supple. No JVD present. No tracheal deviation present. No thyromegaly present. Cardiovascular: Normal rate and regular rhythm. Exam reveals no gallop and no friction rub. No murmur heard. Pulmonary/Chest: Breath sounds normal. No accessory muscle usage or stridor. No respiratory distress. He has no decreased breath sounds. He has no wheezes. He has no rhonchi. He has no rales. He exhibits no tenderness. Abdominal: Soft. Bowel sounds are normal. He exhibits no distension and no mass. There is no hepatosplenomegaly. There is no tenderness. There is no rigidity, no rebound, no guarding, no CVA tenderness, no tenderness at McBurney's point and negative Campos's sign.    Musculoskeletal: Normal range of motion. He exhibits no edema or tenderness. Lymphadenopathy:     He has no cervical adenopathy. Neurological: He is alert and oriented to person, place, and time. He has normal strength. No cranial nerve deficit or sensory deficit. He displays a negative Romberg sign. Coordination normal. GCS eye subscore is 4. GCS verbal subscore is 5. GCS motor subscore is 6. Skin: Skin is warm and dry. He is not diaphoretic. Psychiatric: He has a normal mood and affect. His behavior is normal.   Nursing note and vitals reviewed. MDM  Number of Diagnoses or Management Options  Diagnosis management comments:    * routine laboratory data and UA   * CT head   * Consult to PCP    ED Course       Procedures        CONSULT NOTE:   2:11 PM  Maricel Villanueva NP spoke with Dr. Porsche Fishman MD,   Specialty: Primary Care Physician   Discussed pt's hx, disposition, and available diagnostic and imaging results. Reviewed care plans. Consultant agrees with plans as outlined. MD to admit pt for further work up and evaluation of transient unawareness with possible syncope, seizure, or TIA. Maricel Villanueva NP      2:12 PM  Patient is being admitted to the hospital.  The results of their tests and reasons for their admission have been discussed with them and/or available family. They convey agreement and understanding for the need to be admitted and for their admission diagnosis. Consultation has been made with the inpatient physician specialist for hospitalization.     LABORATORY TESTS:  Recent Results (from the past 12 hour(s))   CBC WITH AUTOMATED DIFF    Collection Time: 12/06/17 11:35 AM   Result Value Ref Range    WBC 10.1 4.1 - 11.1 K/uL    RBC 4.28 4.10 - 5.70 M/uL    HGB 14.4 12.1 - 17.0 g/dL    HCT 42.3 36.6 - 50.3 %    MCV 98.8 80.0 - 99.0 FL    MCH 33.6 26.0 - 34.0 PG    MCHC 34.0 30.0 - 36.5 g/dL    RDW 13.7 11.5 - 14.5 %    PLATELET 201 413 - 722 K/uL    NEUTROPHILS 63 32 - 75 %    LYMPHOCYTES 30 12 - 49 % MONOCYTES 6 5 - 13 %    EOSINOPHILS 1 0 - 7 %    BASOPHILS 0 0 - 1 %    ABS. NEUTROPHILS 6.4 1.8 - 8.0 K/UL    ABS. LYMPHOCYTES 3.1 0.8 - 3.5 K/UL    ABS. MONOCYTES 0.6 0.0 - 1.0 K/UL    ABS. EOSINOPHILS 0.1 0.0 - 0.4 K/UL    ABS. BASOPHILS 0.0 0.0 - 0.1 K/UL   METABOLIC PANEL, COMPREHENSIVE    Collection Time: 12/06/17 11:35 AM   Result Value Ref Range    Sodium 134 (L) 136 - 145 mmol/L    Potassium 4.4 3.5 - 5.1 mmol/L    Chloride 99 97 - 108 mmol/L    CO2 28 21 - 32 mmol/L    Anion gap 7 5 - 15 mmol/L    Glucose 104 (H) 65 - 100 mg/dL    BUN 22 (H) 6 - 20 MG/DL    Creatinine 1.22 0.70 - 1.30 MG/DL    BUN/Creatinine ratio 18 12 - 20      GFR est AA >60 >60 ml/min/1.73m2    GFR est non-AA 56 (L) >60 ml/min/1.73m2    Calcium 9.2 8.5 - 10.1 MG/DL    Bilirubin, total 0.5 0.2 - 1.0 MG/DL    ALT (SGPT) 25 12 - 78 U/L    AST (SGOT) 25 15 - 37 U/L    Alk. phosphatase 100 45 - 117 U/L    Protein, total 7.1 6.4 - 8.2 g/dL    Albumin 3.5 3.5 - 5.0 g/dL    Globulin 3.6 2.0 - 4.0 g/dL    A-G Ratio 1.0 (L) 1.1 - 2.2     URINALYSIS W/MICROSCOPIC    Collection Time: 12/06/17 11:35 AM   Result Value Ref Range    Color YELLOW/STRAW      Appearance CLOUDY (A) CLEAR      Specific gravity 1.018 1.003 - 1.030      pH (UA) 7.0 5.0 - 8.0      Protein 30 (A) NEG mg/dL    Glucose NEGATIVE  NEG mg/dL    Ketone NEGATIVE  NEG mg/dL    Bilirubin NEGATIVE  NEG      Blood NEGATIVE  NEG      Urobilinogen 0.2 0.2 - 1.0 EU/dL    Nitrites NEGATIVE  NEG      Leukocyte Esterase NEGATIVE  NEG      WBC 0-4 0 - 4 /hpf    RBC 0-5 0 - 5 /hpf    Epithelial cells FEW FEW /lpf    Bacteria NEGATIVE  NEG /hpf   TROPONIN I    Collection Time: 12/06/17 11:35 AM   Result Value Ref Range    Troponin-I, Qt. <0.04 <0.05 ng/mL       IMAGING RESULTS:  CT HEAD WO CONT   Final Result        Ct Head Wo Cont    Result Date: 12/6/2017  EXAM:  CT head without contrast INDICATION: Altered mental status.  COMPARISON: CT head 6/22/2015 TECHNIQUE: Axial noncontrast head CT from foramen magnum to vertex. Coronal and sagittal reformatted images were obtained. CT dose reduction was achieved through use of a standardized protocol tailored for this examination and automatic exposure control for dose modulation. FINDINGS:  There is diffuse age-related parenchymal volume loss. The ventricles and sulci are age-appropriate without hydrocephalus. There is no mass effect or midline shift. There is no intracranial hemorrhage or extra-axial fluid collection. Areas of low attenuation in the periventricular white matter most represent stable chronic microvascular ischemic changes. There is no new abnormal parenchymal attenuation. The gray-white matter differentiation is maintained. The basal cisterns are patent. The osseous structures are intact. The visualized paranasal sinuses and mastoid air cells are clear. IMPRESSION: There is no acute intracranial abnormality. MEDICATIONS GIVEN:  Medications - No data to display    IMPRESSION:  1. Transient alteration of awareness        PLAN:  1.  Admit to Internal Medicine, Dr. Ben Estrada NP  2:12 PM

## 2017-12-06 NOTE — H&P
PCP brief note. Found in bathroom with sbp 70 and decreased mentation. Now NP says pt is doing well in ER . Will observe in hospital and ask Cardiology to see. outpt Cardiology is Hector Wong who does not practice at [facility].  I have entered orders and will see later today

## 2017-12-07 ENCOUNTER — APPOINTMENT (OUTPATIENT)
Dept: MRI IMAGING | Age: 82
DRG: 057 | End: 2017-12-07
Attending: INTERNAL MEDICINE
Payer: MEDICARE

## 2017-12-07 PROBLEM — R41.82 ALTERED MENTAL STATUS: Status: ACTIVE | Noted: 2017-12-07

## 2017-12-07 PROBLEM — G20 PRIMARY PARKINSONISM (HCC): Status: ACTIVE | Noted: 2017-12-07

## 2017-12-07 PROBLEM — R41.89 UNRESPONSIVE EPISODE: Status: ACTIVE | Noted: 2017-12-07

## 2017-12-07 LAB
ANION GAP SERPL CALC-SCNC: 7 MMOL/L (ref 5–15)
BUN SERPL-MCNC: 16 MG/DL (ref 6–20)
BUN/CREAT SERPL: 17 (ref 12–20)
CALCIUM SERPL-MCNC: 8.8 MG/DL (ref 8.5–10.1)
CHLORIDE SERPL-SCNC: 104 MMOL/L (ref 97–108)
CK MB CFR SERPL CALC: 2.1 % (ref 0–2.5)
CK MB SERPL-MCNC: 1.3 NG/ML (ref 5–25)
CK SERPL-CCNC: 62 U/L (ref 39–308)
CO2 SERPL-SCNC: 25 MMOL/L (ref 21–32)
CREAT SERPL-MCNC: 0.93 MG/DL (ref 0.7–1.3)
ERYTHROCYTE [DISTWIDTH] IN BLOOD BY AUTOMATED COUNT: 13.4 % (ref 11.5–14.5)
GLUCOSE SERPL-MCNC: 102 MG/DL (ref 65–100)
HCT VFR BLD AUTO: 39.1 % (ref 36.6–50.3)
HGB BLD-MCNC: 13.4 G/DL (ref 12.1–17)
MAGNESIUM SERPL-MCNC: 2.2 MG/DL (ref 1.6–2.4)
MCH RBC QN AUTO: 33.5 PG (ref 26–34)
MCHC RBC AUTO-ENTMCNC: 34.3 G/DL (ref 30–36.5)
MCV RBC AUTO: 97.8 FL (ref 80–99)
PLATELET # BLD AUTO: 186 K/UL (ref 150–400)
POTASSIUM SERPL-SCNC: 4.1 MMOL/L (ref 3.5–5.1)
RBC # BLD AUTO: 4 M/UL (ref 4.1–5.7)
SODIUM SERPL-SCNC: 136 MMOL/L (ref 136–145)
T4 FREE SERPL-MCNC: 1.3 NG/DL (ref 0.8–1.5)
TROPONIN I SERPL-MCNC: <0.04 NG/ML
TSH SERPL DL<=0.05 MIU/L-ACNC: 1.31 UIU/ML (ref 0.36–3.74)
WBC # BLD AUTO: 7.6 K/UL (ref 4.1–11.1)

## 2017-12-07 PROCEDURE — 70544 MR ANGIOGRAPHY HEAD W/O DYE: CPT

## 2017-12-07 PROCEDURE — 74011250637 HC RX REV CODE- 250/637: Performed by: INTERNAL MEDICINE

## 2017-12-07 PROCEDURE — 80048 BASIC METABOLIC PNL TOTAL CA: CPT | Performed by: INTERNAL MEDICINE

## 2017-12-07 PROCEDURE — 85027 COMPLETE CBC AUTOMATED: CPT | Performed by: INTERNAL MEDICINE

## 2017-12-07 PROCEDURE — 84484 ASSAY OF TROPONIN QUANT: CPT | Performed by: INTERNAL MEDICINE

## 2017-12-07 PROCEDURE — 70548 MR ANGIOGRAPHY NECK W/DYE: CPT

## 2017-12-07 PROCEDURE — 83735 ASSAY OF MAGNESIUM: CPT | Performed by: INTERNAL MEDICINE

## 2017-12-07 PROCEDURE — A9577 INJ MULTIHANCE: HCPCS | Performed by: INTERNAL MEDICINE

## 2017-12-07 PROCEDURE — 70553 MRI BRAIN STEM W/O & W/DYE: CPT

## 2017-12-07 PROCEDURE — 84439 ASSAY OF FREE THYROXINE: CPT | Performed by: INTERNAL MEDICINE

## 2017-12-07 PROCEDURE — 99218 HC RM OBSERVATION: CPT

## 2017-12-07 PROCEDURE — 77030012893

## 2017-12-07 PROCEDURE — 82550 ASSAY OF CK (CPK): CPT | Performed by: INTERNAL MEDICINE

## 2017-12-07 PROCEDURE — 36415 COLL VENOUS BLD VENIPUNCTURE: CPT | Performed by: INTERNAL MEDICINE

## 2017-12-07 PROCEDURE — 74011250636 HC RX REV CODE- 250/636: Performed by: INTERNAL MEDICINE

## 2017-12-07 PROCEDURE — 84443 ASSAY THYROID STIM HORMONE: CPT | Performed by: INTERNAL MEDICINE

## 2017-12-07 PROCEDURE — 74011000250 HC RX REV CODE- 250: Performed by: INTERNAL MEDICINE

## 2017-12-07 PROCEDURE — 74011250637 HC RX REV CODE- 250/637: Performed by: PSYCHIATRY & NEUROLOGY

## 2017-12-07 PROCEDURE — 74011000258 HC RX REV CODE- 258: Performed by: INTERNAL MEDICINE

## 2017-12-07 RX ORDER — BUPROPION HYDROCHLORIDE 100 MG/1
100 TABLET, EXTENDED RELEASE ORAL DAILY
Status: DISCONTINUED | OUTPATIENT
Start: 2017-12-07 | End: 2017-12-09 | Stop reason: HOSPADM

## 2017-12-07 RX ORDER — BUPROPION HYDROCHLORIDE 100 MG/1
50 TABLET ORAL
Status: DISCONTINUED | OUTPATIENT
Start: 2017-12-07 | End: 2017-12-09 | Stop reason: HOSPADM

## 2017-12-07 RX ORDER — CARBIDOPA AND LEVODOPA 10; 100 MG/1; MG/1
1 TABLET ORAL 3 TIMES DAILY
Status: DISCONTINUED | OUTPATIENT
Start: 2017-12-07 | End: 2017-12-09 | Stop reason: HOSPADM

## 2017-12-07 RX ORDER — FAMOTIDINE 20 MG/1
20 TABLET, FILM COATED ORAL DAILY
Status: DISCONTINUED | OUTPATIENT
Start: 2017-12-07 | End: 2017-12-09 | Stop reason: HOSPADM

## 2017-12-07 RX ADMIN — ACETAMINOPHEN 650 MG: 325 TABLET ORAL at 02:04

## 2017-12-07 RX ADMIN — CARBIDOPA AND LEVODOPA 1 TABLET: 10; 100 TABLET ORAL at 17:54

## 2017-12-07 RX ADMIN — BUPROPION HYDROCHLORIDE 50 MG: 100 TABLET, FILM COATED ORAL at 17:54

## 2017-12-07 RX ADMIN — SODIUM CHLORIDE 100 ML/HR: 900 INJECTION, SOLUTION INTRAVENOUS at 18:00

## 2017-12-07 RX ADMIN — SODIUM CHLORIDE 100 ML/HR: 900 INJECTION, SOLUTION INTRAVENOUS at 01:54

## 2017-12-07 RX ADMIN — THERA TABS 1 TABLET: TAB at 09:50

## 2017-12-07 RX ADMIN — DOXAZOSIN 1 MG: 1 TABLET ORAL at 09:49

## 2017-12-07 RX ADMIN — AZELASTINE HYDROCHLORIDE 1 SPRAY: 137 SPRAY, METERED NASAL at 02:11

## 2017-12-07 RX ADMIN — SODIUM CHLORIDE 100 ML: 900 INJECTION, SOLUTION INTRAVENOUS at 12:40

## 2017-12-07 RX ADMIN — CLOPIDOGREL BISULFATE 75 MG: 75 TABLET ORAL at 09:51

## 2017-12-07 RX ADMIN — BUPROPION HYDROCHLORIDE 100 MG: 100 TABLET, FILM COATED, EXTENDED RELEASE ORAL at 09:48

## 2017-12-07 RX ADMIN — GADOBENATE DIMEGLUMINE 16 ML: 529 INJECTION, SOLUTION INTRAVENOUS at 12:40

## 2017-12-07 RX ADMIN — Medication 10 ML: at 14:01

## 2017-12-07 RX ADMIN — CARBIDOPA AND LEVODOPA 1 TABLET: 10; 100 TABLET ORAL at 22:26

## 2017-12-07 RX ADMIN — TIMOLOL MALEATE 1 DROP: 5 SOLUTION OPHTHALMIC at 13:00

## 2017-12-07 RX ADMIN — Medication 10 ML: at 06:34

## 2017-12-07 RX ADMIN — LATANOPROST 1 DROP: 50 SOLUTION OPHTHALMIC at 22:27

## 2017-12-07 RX ADMIN — METOPROLOL SUCCINATE 25 MG: 25 TABLET, EXTENDED RELEASE ORAL at 09:50

## 2017-12-07 RX ADMIN — FAMOTIDINE 20 MG: 20 TABLET ORAL at 09:49

## 2017-12-07 RX ADMIN — Medication 10 ML: at 22:28

## 2017-12-07 NOTE — ED NOTES
Assisted patient with urinal, patient denies any additional needs, family at bedside, V/S stable, patient is alert

## 2017-12-07 NOTE — PROGRESS NOTES
Problem: Falls - Risk of  Goal: *Absence of Falls  Document Ritu Fall Risk and appropriate interventions in the flowsheet.   Outcome: Progressing Towards Goal  Fall Risk Interventions:  Mobility Interventions: Assess mobility with egress test, Bed/chair exit alarm, Communicate number of staff needed for ambulation/transfer, OT consult for ADLs, Patient to call before getting OOB, PT Consult for mobility concerns, PT Consult for assist device competence, Strengthening exercises (ROM-active/passive)              Elimination Interventions: Bed/chair exit alarm, Call light in reach, Patient to call for help with toileting needs, Toilet paper/wipes in reach, Toileting schedule/hourly rounds, Urinal in reach    History of Falls Interventions: Bed/chair exit alarm, Consult care management for discharge planning, Door open when patient unattended, Evaluate medications/consider consulting pharmacy, Investigate reason for fall, Room close to nurse's station, Utilize gait belt for transfer/ambulation

## 2017-12-07 NOTE — CONSULTS
NEUROLOGY  12/7/2017     Consulted by: Harvey Alba IV, *        Patient ID:  Shelli Alejo  [id number]  87 y.o.  2/19/1927    Chief Complaint   Patient presents with    Altered mental status       HPI    Ej Perez is a 80-year-old gentleman with multiple chronic conditions (CAD/CABG, TIA, anxiety, spinal stenosis, hypertension, depression) who is here in the hospital after being found in an unresponsive state at his senior care. He was found on closed over his Rollator. Very low blood pressure recorded. Family at the bedside tell me he has been having increasing fatigue in recent months. Recently had some minor medication changes from once daily to twice daily Wellbutrin. He takes Plavix daily. Head CT was done showing atrophy and chronic ischemic changes. Daughter says he is basically back to his baseline this morning. The patient is aware of his declining health and has actually asked to be moved into full-time nursing care. He denies any hallucinations or vivid dreams or nightmares. No drooling. He does have chronic constipation. He is moving a lot slower now. Review of Systems   Gastrointestinal: Positive for constipation. Neurological:        Slowing in general  Found unresponsive   All other systems reviewed and are negative.       Past Medical History:   Diagnosis Date    Arthritis     spine, hip    CAD (coronary artery disease)     bypass surgery 22 years ago, 1988    Chronic pain     right hip    DNR (do not resuscitate) 11/26/2014 11/26/14    GERD (gastroesophageal reflux disease)     Glaucoma     Hard of hearing     Hip arthritis 12/5/2011    HTN (hypertension) 9/13/2011    Hyponatremia 11/28/2011    Osteoporosis     Overactive bladder     Peripheral neuropathy 9/13/2011    Pneumonia     Psychiatric disorder     anxiety    Syncope 9/13/2011    Thrombocytopenia ([facility] [address] 11/6/2013    Unspecified adverse effect of anesthesia     PT REQUESTS SPINAL ANESTHESIA , TO BE DISCUSSED WITH ANESTHESIOLOGIST DOS     Family History   Problem Relation Age of Onset    Diabetes Brother     Heart Disease Brother     Heart Disease Father     Stroke Father      Social History     Social History    Marital status:      Spouse name: N/A    Number of children: N/A    Years of education: N/A     Occupational History    Not on file.      Social History Main Topics    Smoking status: Former Smoker    Smokeless tobacco: Never Used    Alcohol use 0.5 oz/week     1 Glasses of wine per week      Comment: infrequent     Drug use: No    Sexual activity: Not on file     Other Topics Concern    Not on file     Social History Narrative     Current Facility-Administered Medications   Medication Dose Route Frequency    buPROPion SR (WELLBUTRIN SR) tablet 100 mg  100 mg Oral DAILY    buPROPion (WELLBUTRIN) tablet 50 mg  50 mg Oral PCD    famotidine (PEPCID) tablet 20 mg  20 mg Oral DAILY    acetaminophen (TYLENOL) tablet 325 mg  325 mg Oral DAILY    acetaminophen (TYLENOL) tablet 650 mg  650 mg Oral QHS    baicalin-catechin 500 mg cap 500 mg (Patient Supplied)  500 mg Oral BID    clopidogrel (PLAVIX) tablet 75 mg  75 mg Oral DAILY    doxazosin (CARDURA) tablet 1 mg  1 mg Oral DAILY    guaiFENesin ER (MUCINEX) tablet 600 mg  600 mg Oral Q12H PRN    mirabegron ER (MYRBETRIQ) tablet 25 mg (Patient Supplied)  25 mg Oral QHS    nitroglycerin (NITROSTAT) tablet 0.4 mg  0.4 mg SubLINGual Q5MIN PRN    polyethylene glycol (MIRALAX) packet 17 g  17 g Oral DAILY PRN    timolol (TIMOPTIC) 0.5 % ophthalmic solution 1 Drop  1 Drop Left Eye DAILY    sodium chloride (NS) flush 5-10 mL  5-10 mL IntraVENous Q8H    sodium chloride (NS) flush 5-10 mL  5-10 mL IntraVENous PRN    0.9% sodium chloride infusion  100 mL/hr IntraVENous CONTINUOUS    acetaminophen (TYLENOL) tablet 650 mg  650 mg Oral Q4H PRN    therapeutic multivitamin (THERAGRAN) tablet 1 Tab  1 Tab Oral DAILY    azelastine (ASTELIN) 137mcg/spray nasal spray  1 Spray Both Nostrils BID    metoprolol succinate (TOPROL-XL) XL tablet 25 mg  25 mg Oral DAILY WITH BREAKFAST    latanoprost (XALATAN) 0.005 % ophthalmic solution 1 Drop  1 Drop Left Eye QHS     Allergies   Allergen Reactions    Latex Rash    Actonel [Risedronate] Unknown (comments)    Augmentin [Amoxicillin-Pot Clavulanate] Diarrhea    Azithromycin Hives    Crestor [Rosuvastatin] Myalgia    Lescol [Fluvastatin] Myalgia    Micardis [Telmisartan] Unknown (comments)    Morphine Nausea and Vomiting    Prolia [Denosumab] Other (comments)     Dental difficulties     Sulfa (Sulfonamide Antibiotics) Unknown (comments)    Zocor [Simvastatin] Myalgia       Visit Vitals    /69 (BP 1 Location: Right arm, BP Patient Position: At rest)    Pulse 64    Temp 98 °F (36.7 °C)    Resp 13    Ht 5' 6\" (1.676 m)    Wt 81.6 kg (179 lb 12.8 oz)    SpO2 93%    BMI 29.02 kg/m2     Physical Exam   Constitutional: He appears well-developed and well-nourished. Cardiovascular: Normal rate. Pulmonary/Chest: Effort normal.   Skin: Skin is warm and dry. Vitals reviewed. Neurologic Exam     Mental Status        Elderly gentleman in bed awake and alert. He can answer questions and follow commands. His face is grossly symmetric. Reduced blink rate noted. Masklike face in general.  Pupils are equal, EOMI  Tongue is midline  Speech is soft and hoarse    Limited range of motion in both arms so he cannot lift from the bed due to chronic shoulder pathology  Lower extremities with diffuse pain due to various orthopedic issues and lumbar spinal stenosis. Pain limits lifting his legs.   Gait deferred due to his condition    Mild cogwheel in the left arm            Lab Results  Component Value Date/Time   WBC 7.6 12/07/2017 06:25 AM   WBC (POC) 6.6 02/22/2017 12:43 PM   HGB 13.4 12/07/2017 06:25 AM   HGB (POC) 14.1 02/22/2017 12:43 PM   HCT 39.1 12/07/2017 06:25 AM   HCT (POC) 43.1 02/22/2017 12:43 PM   PLATELET 852 91/53/5883 06:25 AM   PLATELET (POC) 277 89/41/4531 12:43 PM   MCV 97.8 12/07/2017 06:25 AM   MCV (POC) 99.0 02/22/2017 12:43 PM     Lab Results  Component Value Date/Time   Hemoglobin A1c 5.6 06/23/2015 03:44 AM   Hemoglobin A1c, External 5.9% 01/15/2015   Glucose 102 12/07/2017 06:25 AM   Glucose (POC) 96 06/25/2015 05:18 AM   LDL, calculated 97.8 06/23/2015 03:44 AM   Creatinine 0.93 12/07/2017 06:25 AM      Lab Results  Component Value Date/Time   Cholesterol, total 148 06/23/2015 03:44 AM   Cholesterol (POC) 177 02/22/2017 12:43 PM   HDL Cholesterol 39 06/23/2015 03:44 AM   LDL, calculated 97.8 06/23/2015 03:44 AM   LDL Cholesterol (POC) 119 02/22/2017 12:43 PM   LDL-C, External 82.6 01/15/2015   Triglyceride 56 06/23/2015 03:44 AM   Triglycerides (POC) 60 02/22/2017 12:43 PM   CHOL/HDL Ratio 3.8 06/23/2015 03:44 AM   Lab Results  Component Value Date/Time   ALT (SGPT) 25 12/06/2017 11:35 AM   AST (SGOT) 25 12/06/2017 11:35 AM   Alk. phosphatase 100 12/06/2017 11:35 AM   Bilirubin, total 0.5 12/06/2017 11:35 AM   Albumin 3.5 12/06/2017 11:35 AM   Protein, total 7.1 12/06/2017 11:35 AM   INR 1.0 06/22/2015 05:54 PM   Prothrombin time 10.3 06/22/2015 05:54 PM   PLATELET 664 62/94/4762 06:25 AM   PLATELET (POC) 733 05/83/7743 12:43 PM       Lab Results  Component Value Date/Time   TSH 1.31 12/07/2017 06:25 AM   T4, Free 1.3 12/07/2017 06:25 AM                     CT Results (maximum last 3): Results from [place] encounter on 12/06/17   CT HEAD WO CONT   Narrative EXAM:  CT head without contrast    INDICATION: Altered mental status. COMPARISON: CT head 6/22/2015    TECHNIQUE: Axial noncontrast head CT from foramen magnum to vertex. Coronal and  sagittal reformatted images were obtained. CT dose reduction was achieved  through use of a standardized protocol tailored for this examination and  automatic exposure control for dose modulation.     FINDINGS:  There is diffuse age-related parenchymal volume loss. The ventricles  and sulci are age-appropriate without hydrocephalus. There is no mass effect or  midline shift. There is no intracranial hemorrhage or extra-axial fluid  collection. Areas of low attenuation in the periventricular white matter most  represent stable chronic microvascular ischemic changes. There is no new  abnormal parenchymal attenuation. The gray-white matter differentiation is  maintained. The basal cisterns are patent. The osseous structures are intact. The visualized paranasal sinuses and mastoid  air cells are clear. Impression IMPRESSION:   There is no acute intracranial abnormality. Results from [place] encounter on 06/22/15   CT HEAD WO CONT   Narrative **Final Report**      ICD Codes / Adm. Diagnosis: 527988   / Altered mental status  ams  Examination:  CT HEAD WO CON  - [id number] - Jun 22 2015  6:25PM  Accession No:  [id number]  Reason:  right facial droop, non sensical speech      REPORT:  INDICATION: right facial droop, non sensical speech     Exam: Noncontrast CT of the brain is performed with 5 mm collimation. Direct comparison is made to prior MR dated 10/2011. FINDINGS: There is mild diffuse cortical atrophy. There is no acute   intracranial hemorrhage, mass, mass effect or herniation. Ventricular system   is normal. The gray-white matter differentiation is well-preserved. The   mastoid air cells are well pneumatized. The visualized paranasal sinuses are   normal.       IMPRESSION: No acute intracranial hemorrhage, mass or infarct. Signing/Reading Doctor: LORNA Peterson ([id number])    Approved: LORNA Peterson ([id number])  Jun 22 2015  6:35PM                                      MRI Results (maximum last 3): Results from [place] encounter on 06/22/15   MRA BRAIN WO CONT   Narrative **Final Report**      ICD Codes / Adm. Diagnosis: 433.91  218316 / Occlusion and stenosis of unsp    Altered mental status  Examination:  MR HEAD MRA WO CON  - [id number] - Jun 22 2015  8:57PM  Accession No:  [id number]  Reason:  cva? REPORT:  INDICATION: cva? Multiplanar noncontrast MRA of the [place] is performed with time   of flight imaging. The vessels comprising the [place] demonstrate classic anatomy. There is no significant stenosis, occlusion, vascular malformation or   aneurysm. IMPRESSION: Normal MRA of the [place]. Signing/Reading Doctor: LORNA Johnson Royalty ([id number])    Approved: LORNA ROSAS ([id number])  Jun 22 2015  9:17PM                                    MRI BRAIN W WO CONT   Narrative **Final Report**      ICD Codes / Adm. Diagnosis: 433.91  849174 / Occlusion and stenosis of unsp    Altered mental status  Examination:  MR BRAIN W AND WO CON  - [id number] - Jun 22 2015  8:57PM  Accession No:  [id number]  Reason:  cva? REPORT:  Preliminary report: No acute intracranial hemorrhage or infarct. Atrophy. Full report to follow. CLINICAL HISTORY: Possible CVA        INDICATION: cva? .           COMPARISON: None    TECHNIQUE: MR examination of the brain includes axial and sagittal T1   pre-and-post contrast, axial T2, axial FLAIR, axial gradient echo, axial   DWI, coronal T1 postcontrast.    Contrast: The patient was administered 7 ML of gadolinium based contrast   material axial and coronal T1-weighted postcontrast enhanced imaging was   obtained. FINDINGS:    There is no intracranial mass. There is no intracranial hemorrhage. There is   no evidence of acute infarction. There is sulcal and ventricular prominence. Confluent periventricular and   scattered abnormal foci of increased T2 signal intensity, corona radiata   centrum semiovale and central milton. Cavernous sinuses are symmetric. Infundibulum is unremarkable. Mild atrophy of the optic chiasm. The brain   architecture is normal. There is no evidence of midline shift or   mass-effect.  There are no extra-axial fluid collections. The mastoid air cells and paranasal sinuses are well pneumatized and clear. IMPRESSION:    No intracranial mass, hemorrhage or evidence of acute infarction. Moderate cerebral atrophy. Mild to moderate chronic microvascular ischemic change. Signing/Reading Doctor: Junior Seo ([id number])    Approved: Junior Seo ([id number])  Jun 23 2015  7:41AM                                    Results from Hospital Encounter encounter on 10/15/11   MRI BRAIN W AND W/O CONTRAST   Narrative **Final Report**      ICD Codes / Adm. Diagnosis: 780.2   / SYNCOPE AND COLLAPSE    Examination:  MR BRAIN W AND WO CON  - [id number] - Oct 15 2011 11:37AM  Accession No:  [id number]  Reason:  SYNCOPE      REPORT:  Sagittal, axial, and coronal MRI of the brain was performed before and after   16 mL IV Magnevist.    FINDINGS: There are multiple foci of high signal intensity in the deep white   matter bilaterally. Findings are nonspecific but usually associated with   chronic ischemic change. There is no intracranial mass or hemorrhage. No   abnormal contrast enhancement. The signal void in the vessels at the base of   the brain is present and normal. No evidence for restricted diffusion. IMPRESSION: Nonspecific high signal in the deep white matter likely due to   chronic ischemic change. Otherwise negative. The white matter changes are slightly worse than prior examination of   04/04/2006. No acute abnormality. Signing/Reading Doctor: Shonda Frank ([id number])    Approved: Shonda Frank ([id number])  10/15/2011                                      VAS/US/Carotid Doppler Results (maximum last 3): Results from [place] encounter on 10/16/17   DUPLEX LOWER EXT VENOUS BILAT    PET Results (maximum last 3): No results found for this or any previous visit. Assessment and Plan        80-year-old gentleman who was found unresponsive in the setting of very low blood pressure.   He is back to his baseline according to daughter. On exam he is displaying a lot of parkinsonism. He has a very masklike face with reduced blink rate, soft hoarse voice. Bradykinetic with some slight cogwheel in the left arm. I discussed with him and the family that I think it will be reasonable to start very low-dose carbidopa. They will think about it. I think obtaining an MRI is reasonable given the very low blood pressure obtained during his episode. It will be prudent to rule out a watershed infarct. I have placed the order. EEG was done and it shows only some diffuse intermittent slowing but no discharges. I will follow-up once the MRI is done.         Leslie Lewis DO  NEUROLOGIST  Diplomate ABPN  12/7/2017

## 2017-12-07 NOTE — PROCEDURES
ELECTROENCEPHALOGRAM REPORT     Patient Name: Marlyn Berry  : 1927  Age: 80 y.o. Ordering physician: Madi Silva  Date of EE2017    Interpreting physician: Magui Anderson DO      PROCEDURE: EEG. CLINICAL INDICATION: The patient is a 80 y.o. male who is being evaluated for baseline electro cerebral activities and to rule out seizure focus. DESCRIPTION OF THE RECORD:   The background of this recording contains a posteriorly-located occipital alpha rhythm of 8 Hz that attenuates with eye opening. There was a normal frequency-amplitude gradient. Throughout the recording, there were intermittent periods of diffuse slowing slightly more so on the left. There were no clear areas of focal spike or spike-and-wave discharges seen. Hyperventilation was not performed. Photic stimulation produced a minimal driving response in the posterior head regions. During the recording, the patient did enter prolonged states of sleep with K-complexes and symmetric sleep spindles seen in the central head regions. INTERPRETATION:     This is a mildly abnormal electroencephalogram showing diffuse slowing suggestive for global cortical dysfunction which is nonspecific and may be seen in underlying metabolic/infectious/inflammatory/structural processes. No clear focal abnormalities or epileptiform activity was seen. Clinical correlation recommended.       Brain Brittani, DO  Diplomate, American Board of Psychiatry & Neurology (Neurology)

## 2017-12-07 NOTE — PROGRESS NOTES
Physical Therapy Screening:    An [facility] screening referral was triggered for physical therapy based on results obtained during the nursing admission assessment. The patients chart was reviewed and the patient is appropriate for a skilled therapy evaluation if there is a decline in functional mobility from baseline. Please order a consult for physical therapy if you are in agreement and would like an evaluation to be completed. Thank you.     Dean Talavera, PT

## 2017-12-07 NOTE — INTERDISCIPLINARY ROUNDS
IDR/SLIDR Summary          Patient: Umesh Camargo III MRN: [id number]    Age: 80 y.o. YOB: 1927 Room/Bed: [facility]   Admit Diagnosis: Syncope  Principal Diagnosis: <principal problem not specified>   Goals:   Readmission: NO  Quality Measure:   VTE Prophylaxis: Mechanical  Influenza Vaccine screening completed? YES  Pneumococcal Vaccine screening completed? YES  Mobility needs: Yes   Nutrition plan:  Consults:P.T, O.T., Speech and Case Management    Financial concerns:  Escalated to CM? RRAT Score: 10   Interventions:  Testing due for pt today?  YES  LOS: 0 days Expected length of stay 1 days  Discharge plan: Pending   PCP: Екатерина Galicia MD  Transportation needs: Yes    Days before discharge:one day until discharge   Discharge disposition: SNF    Signed:     María Elena Bowser  12/7/2017  5:23 AM

## 2017-12-07 NOTE — H&P
[address], [address] [place] [address]   HISTORY AND PHYSICAL       Name:  Cece Dumas   MR#:  [id number]   :  1927   Account #:  [de-identified]        Date of Adm:  2017            CHIEF COMPLAINT: A 66-year-old white male admitted with an   episode of altered mental status. HISTORY OF PRESENT ILLNESS: The patient resides in healthcare   at [facility]. History is from him and his family, and the   emergency room nurse practitioner, with whom I spoke. In the past   several days he has felt fatigued. Two days ago his Wellbutrin was   Changed by Psychiatry  from once daily formulation, 150 mg daily to Wellbutrin SR   100 mg b.i.d.     Yesterday afternoon he took a nap. Last night; however, he woke up   and spoke with his daughter about her recent trip. He recalls going to   bed. He normally sleeps in shorts and a T-shirt. This morning he was   found by the [facility] staff with clothes  off with altered   mental status, lying over his rolator with no clothes on with a blood   pressure of 70/50. He had altered mental status. He was placed back   to bed. He later felt well enough to sit in a chair. He was somewhat   confused during breakfast.     He came to the ER. No fever or chills. Head CT was negative without   contrast in the ER. His speech has progressively improved since he   has been here, although he feels like he has mild word finding   difficulty. He is referred now to observation for further evaluation and   treatment. PAST MEDICAL HISTORY: Coronary artery disease, status post    single-vessel CABG. Previous syncope, TIA, anxiety, pneumonia,   peripheral neuropathy, DJD, overactive bladder, osteoporosis,   hyponatremia, hypertension, degenerative arthritis, glaucoma, GERD,   thrombocytopenia. DNR CODE STATUS. PAST SURGICAL HISTORY: CABG, carotid endarterectomy, bilateral   knee replacement surgery.   Lumbar laminectomy, ventral hernia and   abdominal herniorrhaphy with mesh, left inguinal herniorrhaphy,   bilateral cataract surgery, Spigelian hernia repair, right shoulder   decompression, right hip surgery, tonsillectomy. MEDICATIONS:   1. Wellbutrin- mg b.i.d.   2. Metoprolol XL 25 mg daily. 3. MiraLax 17 grams daily. 4. Pepcid 20 mg b.i.d.   5. Cerefolin   6. L-methylfolate   7. B12   8. Acetylcysteine 1 tablet daily. 9. Milk of Magnesia 30 mL p.o. daily p.r.n. constipation. 10. Imodium 2 mg p.o.  p.r.n. 11. Ketaconazole 2% b.i.d. to local skin irritation. 12. Hydrocortisone 2.5% cream to affected area daily as needed. 13. Xalatan administer 1 drop to left eye nightly. 14. Limbrel 500 mg b.i.d.   15. Centrum Silver one daily. 16. Tylenol 325 mg q.a.m.   17. Tylenol 650 mg at bedtime extended release. 18. Astepro 1 spray by right nostril b.i.d. 19. Voltaren 1% topical gel 4 gram q.i.d. 20. Mucinex 1200 mg daily ER q.12h.   21. Myrbetriq 25 mg daily. 22. Plavix 75 mg daily. 23. Timolol 0.5% one drop in left eye daily. 24. Cardura 1 mg daily. 25. NTG 0.4 mg sublingual q.5 minutes p.r.n. chest pain. 26. Amoxicillin 2 grams prior to dental work. ALLERGIES:   1. LATEX:  RASH. 2. ACTONEL UNKNOWN. 3. AUGMENTIN. 4. DIARRHEA. 5. AZITHROMYCIN:  HIVES. 6. ROSUVASTATIN:  MYALGIA. 7. LESCOL: MYALGIA. 8. MICARDIS:  UNKNOWN. 9. MORPHINE:  NAUSEA AND VOMITING. 10. PROLIA:    11. SULFA:  UNKNOWN.   12. ZOCOR, MYALGIA. FAMILY HISTORY: Father had a stroke and heart disease. He is   . Mother is . Brother had diabetes and heart   disease. He is . SOCIAL HISTORY: He denies ETOH. He quit smoking long ago. CODE STATUS: HE HAD A DNR CODE STATUS. REVIEW OF SYSTEMS   Denies fever or chills. He has chronic urinary frequency, recent   constipation. He took 2 doses of Milk of Magnesia. He had some   diarrhea.  He feels like his speech is improving, but not back to   baseline. He denies other acute focal neurologic symptoms. PHYSICAL EXAMINATION   GENERAL: Elderly white male appearing stated age. VITAL SIGNS: Temperature 98.4, pulse 70, normal sinus rhythm, BP   164/81, RR 18, O2 saturation 99% on room air. HEENT: Left temple healing. Skin CA treatment area from Dermatology   EOMI. Pupils react to light. Throat is clear. NECK: No neck nodes palpable. LUNGS: Clear. HEART: Regular with a 2/6 systolic murmur. No gallop or rub heard. ABDOMEN: BS positive, soft, no masses felt, nontender. EXTREMITIES: Knee flexion intact, status post TKR bilaterally. Feet   2+ DP pulses. Light touch sensation intact. NEUROLOGIC: Awake, alert and oriented x3. Slight word finding   difficulty. On light touch testing, he was briefly confused which quickly resolved. Otherwise   neurologic exam is nonfocal.    ADDITIONAL LABORATORY EVALUATION: Sodium 134, glucose   104, BUN 22, creatinine 1.22. GFR estimated non-   56, barely low. Troponin 1 negative x2. Urinalysis 30 protein,   otherwise negative. CBC normal.    IMPRESSION:   1. Altered mental status, under evaluation. Dr. Brandee Johnson has   graciously seen the patient in consultation. Echocardiogram results   were ordered but are not back yet. Further cardiac enzymes and levels   are drawn for tomorrow. IV fluids are given and Neurology consult is   being requested. 2. Overactive bladder. 3. Coronary artery disease. 4. History of peripheral neuropathy. 5. Depression. He denies psychosis and recently requested an   increase in his Wellbutrin dose for more energy.         MD RADHA [facility]   D:  12/06/2017   18:44   T:  12/06/2017   20:33   Job #:  [id number]

## 2017-12-07 NOTE — PROGRESS NOTES
Medical Progress Note      NAME: Peggy Tejeda III   :  1927  MRM:  [id number]    Date/Time: 2017           Problem List:     Active Problems:    CAD (coronary artery disease) (2011)      HTN (hypertension) (2011)      DNR (do not resuscitate) (2014)      Overview: 14      Altered mental status (2017)             Subjective:     Hx GERD , mild throat congestion this am. Denies dizziness, cp,palpitations, sob. NSR on monitor. Last night, brief headache responded to Tylenol . Denies other focal neuro sx. Past Medical History:   Diagnosis Date    Arthritis     spine, hip    CAD (coronary artery disease)     bypass surgery 22 years ago,     Chronic pain     right hip    DNR (do not resuscitate) 2014    GERD (gastroesophageal reflux disease)     Glaucoma     Hard of hearing     Hip arthritis 2011    HTN (hypertension) 2011    Hyponatremia 2011    Osteoporosis     Overactive bladder     Peripheral neuropathy 2011    Pneumonia     Psychiatric disorder     anxiety    Syncope 2011    Thrombocytopenia ([facility] [address] 2013    Unspecified adverse effect of anesthesia     PT REQUESTS SPINAL ANESTHESIA , TO BE DISCUSSED WITH ANESTHESIOLOGIST DOS            Objective:         Vitals:      Last 24hrs VS reviewed since prior progress note.  Most recent are:    Visit Vitals    /69 (BP 1 Location: Right arm, BP Patient Position: At rest)    Pulse 64    Temp 98 °F (36.7 °C)    Resp 13    Ht 5' 6\" (1.676 m)    Wt 179 lb 12.8 oz (81.6 kg)    SpO2 93%    BMI 29.02 kg/m2     SpO2 Readings from Last 6 Encounters:   17 93%   11/06/15 97%   06/25/15 95%   05/13/15 97%   05/04/15 91%   14 96%          Intake/Output Summary (Last 24 hours) at 17 0724  Last data filed at 17 0318   Gross per 24 hour   Intake                0 ml   Output              280 ml   Net             -280 ml Exam:      General:  Alert, cooperative, no distress, appears stated age. Throat clear   Neck supple   Lungs:   Clear to auscultation bilaterally. Heart:  Regular rate and rhythm, S1, S2 normal, old 2/6 systolic murmur,no click, rub or gallop. Abdomen:   Soft, non-tender. Bowel sounds normal. No masses,  No organomegaly. Extremities: No pedal  Edema. Neuro: A and O x 3      Lab Data Reviewed: (see below)  Recent Results (from the past 24 hour(s))   CBC WITH AUTOMATED DIFF    Collection Time: 12/06/17 11:35 AM   Result Value Ref Range    WBC 10.1 4.1 - 11.1 K/uL    RBC 4.28 4.10 - 5.70 M/uL    HGB 14.4 12.1 - 17.0 g/dL    HCT 42.3 36.6 - 50.3 %    MCV 98.8 80.0 - 99.0 FL    MCH 33.6 26.0 - 34.0 PG    MCHC 34.0 30.0 - 36.5 g/dL    RDW 13.7 11.5 - 14.5 %    PLATELET 280 847 - 653 K/uL    NEUTROPHILS 63 32 - 75 %    LYMPHOCYTES 30 12 - 49 %    MONOCYTES 6 5 - 13 %    EOSINOPHILS 1 0 - 7 %    BASOPHILS 0 0 - 1 %    ABS. NEUTROPHILS 6.4 1.8 - 8.0 K/UL    ABS. LYMPHOCYTES 3.1 0.8 - 3.5 K/UL    ABS. MONOCYTES 0.6 0.0 - 1.0 K/UL    ABS. EOSINOPHILS 0.1 0.0 - 0.4 K/UL    ABS. BASOPHILS 0.0 0.0 - 0.1 K/UL   METABOLIC PANEL, COMPREHENSIVE    Collection Time: 12/06/17 11:35 AM   Result Value Ref Range    Sodium 134 (L) 136 - 145 mmol/L    Potassium 4.4 3.5 - 5.1 mmol/L    Chloride 99 97 - 108 mmol/L    CO2 28 21 - 32 mmol/L    Anion gap 7 5 - 15 mmol/L    Glucose 104 (H) 65 - 100 mg/dL    BUN 22 (H) 6 - 20 MG/DL    Creatinine 1.22 0.70 - 1.30 MG/DL    BUN/Creatinine ratio 18 12 - 20      GFR est AA >60 >60 ml/min/1.73m2    GFR est non-AA 56 (L) >60 ml/min/1.73m2    Calcium 9.2 8.5 - 10.1 MG/DL    Bilirubin, total 0.5 0.2 - 1.0 MG/DL    ALT (SGPT) 25 12 - 78 U/L    AST (SGOT) 25 15 - 37 U/L    Alk.  phosphatase 100 45 - 117 U/L    Protein, total 7.1 6.4 - 8.2 g/dL    Albumin 3.5 3.5 - 5.0 g/dL    Globulin 3.6 2.0 - 4.0 g/dL    A-G Ratio 1.0 (L) 1.1 - 2.2     URINALYSIS W/MICROSCOPIC    Collection Time: 12/06/17 11:35 AM   Result Value Ref Range    Color YELLOW/STRAW      Appearance CLOUDY (A) CLEAR      Specific gravity 1.018 1.003 - 1.030      pH (UA) 7.0 5.0 - 8.0      Protein 30 (A) NEG mg/dL    Glucose NEGATIVE  NEG mg/dL    Ketone NEGATIVE  NEG mg/dL    Bilirubin NEGATIVE  NEG      Blood NEGATIVE  NEG      Urobilinogen 0.2 0.2 - 1.0 EU/dL    Nitrites NEGATIVE  NEG      Leukocyte Esterase NEGATIVE  NEG      WBC 0-4 0 - 4 /hpf    RBC 0-5 0 - 5 /hpf    Epithelial cells FEW FEW /lpf    Bacteria NEGATIVE  NEG /hpf   TROPONIN I    Collection Time: 12/06/17 11:35 AM   Result Value Ref Range    Troponin-I, Qt. <0.04 <0.05 ng/mL   EKG, 12 LEAD, INITIAL    Collection Time: 12/06/17 12:03 PM   Result Value Ref Range    Ventricular Rate 68 BPM    Atrial Rate 68 BPM    P-R Interval 158 ms    QRS Duration 90 ms    Q-T Interval 412 ms    QTC Calculation (Bezet) 438 ms    Calculated P Axis 38 degrees    Calculated R Axis -19 degrees    Calculated T Axis 29 degrees    Diagnosis       Normal sinus rhythm  Septal infarct (cited on or before 22-JUN-2015)  When compared with ECG of 22-JUN-2015 17:43,  No significant change was found  Confirmed by Tonny Carson MD, Jose Guadalupe ([id number]) on 12/6/2017 4:14:51 PM     TROPONIN I    Collection Time: 12/06/17  2:36 PM   Result Value Ref Range    Troponin-I, Qt. <0.04 <0.05 ng/mL   CBC W/O DIFF    Collection Time: 12/07/17  6:25 AM   Result Value Ref Range    WBC 7.6 4.1 - 11.1 K/uL    RBC 4.00 (L) 4.10 - 5.70 M/uL    HGB 13.4 12.1 - 17.0 g/dL    HCT 39.1 36.6 - 50.3 %    MCV 97.8 80.0 - 99.0 FL    MCH 33.5 26.0 - 34.0 PG    MCHC 34.3 30.0 - 36.5 g/dL    RDW 13.4 11.5 - 14.5 %    PLATELET 436 301 - 251 K/uL       Medications Reviewed: (see below)    ______________________________________________________________________    Medications:     Current Facility-Administered Medications   Medication Dose Route Frequency    buPROPion SR (WELLBUTRIN SR) tablet 100 mg  100 mg Oral DAILY    buPROPion [facility]) tablet 50 mg  50 mg Oral PCD    famotidine (PEPCID) tablet 20 mg  20 mg Oral DAILY    acetaminophen (TYLENOL) tablet 325 mg  325 mg Oral DAILY    acetaminophen (TYLENOL) tablet 650 mg  650 mg Oral QHS    baicalin-catechin 500 mg cap 500 mg (Patient Supplied)  500 mg Oral BID    clopidogrel (PLAVIX) tablet 75 mg  75 mg Oral DAILY    doxazosin (CARDURA) tablet 1 mg  1 mg Oral DAILY    guaiFENesin ER (MUCINEX) tablet 600 mg  600 mg Oral Q12H PRN    mirabegron ER (MYRBETRIQ) tablet 25 mg (Patient Supplied)  25 mg Oral QHS    nitroglycerin (NITROSTAT) tablet 0.4 mg  0.4 mg SubLINGual Q5MIN PRN    polyethylene glycol (MIRALAX) packet 17 g  17 g Oral DAILY PRN    timolol (TIMOPTIC) 0.5 % ophthalmic solution 1 Drop  1 Drop Left Eye DAILY    sodium chloride (NS) flush 5-10 mL  5-10 mL IntraVENous Q8H    sodium chloride (NS) flush 5-10 mL  5-10 mL IntraVENous PRN    0.9% sodium chloride infusion  100 mL/hr IntraVENous CONTINUOUS    acetaminophen (TYLENOL) tablet 650 mg  650 mg Oral Q4H PRN    therapeutic multivitamin (THERAGRAN) tablet 1 Tab  1 Tab Oral DAILY    azelastine (ASTELIN) 137mcg/spray nasal spray  1 Spray Both Nostrils BID    metoprolol succinate (TOPROL-XL) XL tablet 25 mg  25 mg Oral DAILY WITH BREAKFAST    latanoprost (XALATAN) 0.005 % ophthalmic solution 1 Drop  1 Drop Left Eye QHS                   Assessment:   Episode AMS yesterday morning. P: check Brain MRI/MRA, neck MRA, EEG. Neuro c/s. Empirically return total Wellbutrin dose back to 150 mg daily . Check ECHO and f/u cardiac markers    GERD. Add Pepcid     Mobilize with PT. I spoke w/dtr , here.   Patient Active Problem List   Diagnosis Code    Peripheral neuropathy G62.9    CAD (coronary artery disease) I25.10    HTN (hypertension) I10    Hyperlipidemia E78.5    DJD (degenerative joint disease) M19.90    Hyponatremia E87.1    Anxiety state, unspecified F41.1    Overactive bladder N32.81    Thrombocytopenia ([facility] [address] D69.6    Inguinal hernia unilateral, non-recurrent K40.90    DNR (do not resuscitate) Z73    CVA (cerebral infarction) I63.9    Altered mental status R41.82          Plan:     above                                                       ___________________________________________________      Attending Physician: Hola Poole MD

## 2017-12-07 NOTE — PROGRESS NOTES
Primary Nurse Shelli Espinosa and Bonita Logan, RN performed a dual skin assessment on this patient No impairment noted  Earl score is 18

## 2017-12-07 NOTE — PROGRESS NOTES
Bedside shift change report given to Irineo Aaron RN (oncoming nurse) by Ronny Magana RN (offgoing nurse). Report included the following information SBAR, Kardex, ED Summary, Procedure Summary, Intake/Output, MAR, Accordion, Recent Results, Med Rec Status and Cardiac Rhythm NSR.

## 2017-12-07 NOTE — ROUTINE PROCESS
TRANSFER - OUT REPORT:    Verbal report given to Fifi RN(name) on Mario Alberto Perales III  being transferred to [facility](unit) for routine progression of care       Report consisted of patients Situation, Background, Assessment and   Recommendations(SBAR). Information from the following report(s) SBAR, Kardex, ED Summary, [facility] and Recent Results was reviewed with the receiving nurse. Lines:   Peripheral IV 12/06/17 Left Arm (Active)        Opportunity for questions and clarification was provided.       Patient transported with:   [email or web address]

## 2017-12-08 PROBLEM — R55 SYNCOPE: Status: ACTIVE | Noted: 2017-12-08

## 2017-12-08 PROCEDURE — 74011250636 HC RX REV CODE- 250/636: Performed by: INTERNAL MEDICINE

## 2017-12-08 PROCEDURE — 97165 OT EVAL LOW COMPLEX 30 MIN: CPT

## 2017-12-08 PROCEDURE — 99218 HC RM OBSERVATION: CPT

## 2017-12-08 PROCEDURE — 97530 THERAPEUTIC ACTIVITIES: CPT

## 2017-12-08 PROCEDURE — 74011250637 HC RX REV CODE- 250/637: Performed by: INTERNAL MEDICINE

## 2017-12-08 PROCEDURE — 97161 PT EVAL LOW COMPLEX 20 MIN: CPT

## 2017-12-08 PROCEDURE — 65660000000 HC RM CCU STEPDOWN

## 2017-12-08 PROCEDURE — G8988 SELF CARE GOAL STATUS: HCPCS

## 2017-12-08 PROCEDURE — 97535 SELF CARE MNGMENT TRAINING: CPT

## 2017-12-08 PROCEDURE — G8987 SELF CARE CURRENT STATUS: HCPCS

## 2017-12-08 PROCEDURE — 74011250637 HC RX REV CODE- 250/637: Performed by: PSYCHIATRY & NEUROLOGY

## 2017-12-08 RX ORDER — SODIUM CHLORIDE 9 MG/ML
100 INJECTION, SOLUTION INTRAVENOUS CONTINUOUS
Status: DISCONTINUED | OUTPATIENT
Start: 2017-12-08 | End: 2017-12-09 | Stop reason: HOSPADM

## 2017-12-08 RX ADMIN — TIMOLOL MALEATE 1 DROP: 5 SOLUTION OPHTHALMIC at 09:37

## 2017-12-08 RX ADMIN — Medication 10 ML: at 17:48

## 2017-12-08 RX ADMIN — Medication 10 ML: at 05:13

## 2017-12-08 RX ADMIN — BUPROPION HYDROCHLORIDE 100 MG: 100 TABLET, FILM COATED, EXTENDED RELEASE ORAL at 09:38

## 2017-12-08 RX ADMIN — BUPROPION HYDROCHLORIDE 50 MG: 100 TABLET, FILM COATED ORAL at 17:41

## 2017-12-08 RX ADMIN — THERA TABS 1 TABLET: TAB at 09:37

## 2017-12-08 RX ADMIN — Medication 10 ML: at 22:04

## 2017-12-08 RX ADMIN — CARBIDOPA AND LEVODOPA 1 TABLET: 10; 100 TABLET ORAL at 09:58

## 2017-12-08 RX ADMIN — CARBIDOPA AND LEVODOPA 1 TABLET: 10; 100 TABLET ORAL at 22:03

## 2017-12-08 RX ADMIN — CLOPIDOGREL BISULFATE 75 MG: 75 TABLET ORAL at 09:37

## 2017-12-08 RX ADMIN — SODIUM CHLORIDE 100 ML/HR: 900 INJECTION, SOLUTION INTRAVENOUS at 17:41

## 2017-12-08 RX ADMIN — METOPROLOL SUCCINATE 25 MG: 25 TABLET, EXTENDED RELEASE ORAL at 09:37

## 2017-12-08 RX ADMIN — AZELASTINE HYDROCHLORIDE 1 SPRAY: 137 SPRAY, METERED NASAL at 22:01

## 2017-12-08 RX ADMIN — LATANOPROST 1 DROP: 50 SOLUTION OPHTHALMIC at 22:04

## 2017-12-08 RX ADMIN — FAMOTIDINE 20 MG: 20 TABLET ORAL at 09:37

## 2017-12-08 RX ADMIN — DOXAZOSIN 1 MG: 1 TABLET ORAL at 09:58

## 2017-12-08 NOTE — PROGRESS NOTES
12/8/2017     Admit Date: 12/6/2017    Admit Diagnosis: Syncope  Syncope    Active Problems:    CAD (coronary artery disease) (9/13/2011)      HTN (hypertension) (9/13/2011)      DNR (do not resuscitate) (11/26/2014)      Overview: 11/26/14      Altered mental status (12/7/2017)      Unresponsive episode (12/7/2017)      Primary Parkinsonism ([address] (12/7/2017)      Syncope (12/8/2017)        Assessment/Plan:  Mr Nikolai Nunes appears to have had a hypotensive response to Sinemet which has now been stopped. He is also receiving IVF  His echo shows moderate aortic stenosis with preserved LV systolic function  The spells he had at [facility] were most likely severe orthostasis complicated by aortic stenosis  Recommendations:  Keep -882 systolic  Avoid vasodilators and diuretics  Agree with tentative discharge plans     Subjective:  Mr Nikolai Nunes says that he feels ok       Ana Luisa Hammer III denies chest pain. Objective:     Visit Vitals    BP 95/52 (BP 1 Location: Right arm)    Pulse 66    Temp 97.8 °F (36.6 °C)    Resp 16    Ht 5' 6\" (1.676 m)    Wt 81.3 kg (179 lb 3.2 oz)    SpO2 97%    BMI 28.92 kg/m2        Physical Exam:  Neck: supple, symmetrical, trachea midline, no adenopathy, thyroid: not enlarged, symmetric, no tenderness/mass/nodules, no carotid bruit and no JVD  Heart: regular rate and rhythm, S1, S2 normal, systolic murmur: early systolic 3/6, musical at 2nd left intercostal space, at 2nd right intercostal space, at lower left sternal border  Lungs: clear to auscultation bilaterally, normal percussion bilaterally  Abdomen: soft, non-tender.  Bowel sounds normal. No masses,  no organomegaly  Extremities: extremities normal, atraumatic, no cyanosis or edema  Pulses: 2+ and symmetric  Neurologic: Grossly normal      Labs:    Recent Labs      12/07/17   0625   CPK  62   CKMB  1.3   TROIQ  <0.04     Recent Labs      12/07/17   0625   NA  136   K  4.1   CL  104   BUN  16   CREA  0.93   GLU  102*   CA  8.8     Recent Labs      12/07/17   0625   WBC  7.6   HGB  13.4   HCT  39.1   PLT  186     No results for input(s): CHOL, LDLC in the last 72 hours.     No lab exists for component: TGL, HDLC,  HBA1C    Telemetry: normal sinus rhythm     Data Review: current meds, labs,recent radiology, intake/output/weight and problem list reviewed

## 2017-12-08 NOTE — PROGRESS NOTES
Bedside shift change report given to Kleber Wolfe RN (oncoming nurse) by Lalo Sawyer RN (offgoing nurse). Report included the following information SBAR, Kardex, ED Summary, Procedure Summary, Intake/Output, MAR, Accordion, Recent Results, Med Rec Status and Cardiac Rhythm NSR.

## 2017-12-08 NOTE — PROGRESS NOTES
Medical Progress Note      NAME: Jaqueline Guzman III   :  1927  MRM:  [id number]    Date/Time: 2017           Problem List:     Active Problems:    CAD (coronary artery disease) (2011)      HTN (hypertension) (2011)      DNR (do not resuscitate) (2014)      Overview: 14      Altered mental status (2017)      Unresponsive episode (2017)      Primary Parkinsonism ([address] (2017)             Subjective:     MRI [de-identified] noted. Denies dizziness. Cp.     Past Medical History:   Diagnosis Date    Arthritis     spine, hip    CAD (coronary artery disease)     bypass surgery 22 years ago,     Chronic pain     right hip    DNR (do not resuscitate) 2014    GERD (gastroesophageal reflux disease)     Glaucoma     Hard of hearing     Hip arthritis 2011    HTN (hypertension) 2011    Hyponatremia 2011    Osteoporosis     Overactive bladder     Peripheral neuropathy 2011    Pneumonia     Psychiatric disorder     anxiety    Syncope 2011    Thrombocytopenia ([facility] [address] 2013    Unspecified adverse effect of anesthesia     PT REQUESTS SPINAL ANESTHESIA , TO BE DISCUSSED WITH ANESTHESIOLOGIST DOS            Objective:         Vitals:      Last 24hrs VS reviewed since prior progress note.  Most recent are:    Visit Vitals    /71 (BP 1 Location: Right arm, BP Patient Position: At rest)    Pulse (!) 59    Temp 97.5 °F (36.4 °C)    Resp 14    Ht 5' 6\" (1.676 m)    Wt 179 lb 3.2 oz (81.3 kg)    SpO2 94%    BMI 28.92 kg/m2     SpO2 Readings from Last 6 Encounters:   17 94%   11/06/15 97%   06/25/15 95%   05/13/15 97%   05/04/15 91%   14 96%          Intake/Output Summary (Last 24 hours) at 17 0703  Last data filed at 17 9310   Gross per 24 hour   Intake                0 ml   Output              745 ml   Net             -745 ml                  Exam:      General:  Alert, cooperative, no distress, appears stated age. Lungs:   Clear to auscultation bilaterally. Heart:  Regular rate and rhythm, S1, S2 normal, no murmur, click, rub or gallop. Abdomen:   Soft, non-tender. Bowel sounds normal. No masses,  No organomegaly. Extremities: No ankle edema  Neuro: left arm slight tremor and cogwheeling   O/w grossly non focal      Lab Data Reviewed: (see below)  No results found for this or any previous visit (from the past 24 hour(s)).     Medications Reviewed: (see below)    ______________________________________________________________________    Medications:     Current Facility-Administered Medications   Medication Dose Route Frequency    buPROPion SR (WELLBUTRIN SR) tablet 100 mg  100 mg Oral DAILY    buPROPion (WELLBUTRIN) tablet 50 mg  50 mg Oral PCD    famotidine (PEPCID) tablet 20 mg  20 mg Oral DAILY    carbidopa-levodopa (SINEMET)  mg per tablet 1 Tab  1 Tab Oral TID    acetaminophen (TYLENOL) tablet 325 mg  325 mg Oral DAILY    acetaminophen (TYLENOL) tablet 650 mg  650 mg Oral QHS    baicalin-catechin 500 mg cap 500 mg (Patient Supplied)  500 mg Oral BID    clopidogrel (PLAVIX) tablet 75 mg  75 mg Oral DAILY    doxazosin (CARDURA) tablet 1 mg  1 mg Oral DAILY    guaiFENesin ER (MUCINEX) tablet 600 mg  600 mg Oral Q12H PRN    mirabegron ER (MYRBETRIQ) tablet 25 mg (Patient Supplied)  25 mg Oral QHS    nitroglycerin (NITROSTAT) tablet 0.4 mg  0.4 mg SubLINGual Q5MIN PRN    polyethylene glycol (MIRALAX) packet 17 g  17 g Oral DAILY PRN    timolol (TIMOPTIC) 0.5 % ophthalmic solution 1 Drop  1 Drop Left Eye DAILY    sodium chloride (NS) flush 5-10 mL  5-10 mL IntraVENous Q8H    sodium chloride (NS) flush 5-10 mL  5-10 mL IntraVENous PRN    acetaminophen (TYLENOL) tablet 650 mg  650 mg Oral Q4H PRN    therapeutic multivitamin (THERAGRAN) tablet 1 Tab  1 Tab Oral DAILY    azelastine (ASTELIN) 137mcg/spray nasal spray  1 Spray Both Nostrils BID    metoprolol succinate (TOPROL-XL) XL tablet 25 mg  25 mg Oral DAILY WITH BREAKFAST    latanoprost (XALATAN) 0.005 % ophthalmic solution 1 Drop  1 Drop Left Eye QHS                   Assessment:     Possible Parkisonism. Sinemet started yesterday   AMS on admission. ?related to increased Wellbutrin dose. Dose has been returned to 150 mg total daily   At d/c use Wellbutrin  mg daily   [facility]   Patient Active Problem List   Diagnosis Code    Peripheral neuropathy G62.9    CAD (coronary artery disease) I25.10    HTN (hypertension) I10    Hyperlipidemia E78.5    DJD (degenerative joint disease) M19.90    Hyponatremia E87.1    Anxiety state, unspecified F41.1    Overactive bladder N32.81    Thrombocytopenia (HCC) D69.6    Inguinal hernia unilateral, non-recurrent K40.90    DNR (do not resuscitate) Z66    CVA (cerebral infarction) I63.9    Altered mental status R41.82    Unresponsive episode R41.89    Primary Parkinsonism ([address] G20          Plan:     dtr Elvin Estimable, here, agrees.                                                        ___________________________________________________      Attending Physician: Ben Piper MD

## 2017-12-08 NOTE — PROGRESS NOTES
Problem: Falls - Risk of  Goal: *Absence of Falls  Document Ritu Fall Risk and appropriate interventions in the flowsheet.    Outcome: Progressing Towards Goal  Fall Risk Interventions:  Mobility Interventions: Assess mobility with egress test, Communicate number of staff needed for ambulation/transfer, Mechanical lift, OT consult for ADLs, PT Consult for mobility concerns, PT Consult for assist device competence, Strengthening exercises (ROM-active/passive)         Medication Interventions: Assess postural VS orthostatic hypotension, Bed/chair exit alarm, Evaluate medications/consider consulting pharmacy, Patient to call before getting OOB, Teach patient to arise slowly, Utilize gait belt for transfers/ambulation    Elimination Interventions: Bed/chair exit alarm, Call light in reach, Elevated toilet seat, Patient to call for help with toileting needs, Toilet paper/wipes in reach, Toileting schedule/hourly rounds, Urinal in reach    History of Falls Interventions: Bed/chair exit alarm, Consult care management for discharge planning, Door open when patient unattended, Evaluate medications/consider consulting pharmacy, Investigate reason for fall, Room close to nurse's station, Utilize gait belt for transfer/ambulation

## 2017-12-08 NOTE — PROGRESS NOTES
Problem: Mobility Impaired (Adult and Pediatric)  Goal: *Acute Goals and Plan of Care (Insert Text)  Physical Therapy Goals  Initiated 12/8/2017  1. Patient will move from supine to sit and sit to supine , scoot up and down and roll side to side in bed with supervision/set-up within 7 day(s). 2.  Patient will transfer from bed to chair and chair to bed with minimal assistance/contact guard assist using the least restrictive device within 7 day(s). 3.  Patient will perform sit to stand with minimal assistance/contact guard assist within 7 day(s). 4.  Patient will ambulate with minimal assistance/contact guard assist for 25 feet with the least restrictive device within 7 day(s). physical Therapy EVALUATION  Patient: Rj Al III (79 y.o. male)  Date: 12/8/2017  Primary Diagnosis: Syncope  Syncope        Precautions:   Fall    ASSESSMENT :   Based on the objective data described below, the patient presents with decreased independence with functional mobility limited by generalized weakness, decreased ROM, and decreased activity tolerance. Patient resides in health care section of [address] [place] [address]. Requiring additional assistance this for transfers because no DME readily available for patient to transfer to [facility]. Pt is very pleasant and very eager to work with therapist in the hospital.  Recommend return to healthcare setting at [facility]. Will continue to follow during admission for strengthening. Patient will benefit from skilled intervention to address the above impairments.   Patients rehabilitation potential is considered to be Good  Factors which may influence rehabilitation potential include:   []         None noted  []         Mental ability/status  []         Medical condition  []         Home/family situation and support systems  [x]         Safety awareness  []         Pain tolerance/management  []         Other:      PLAN :  Recommendations and Planned Interventions:  [x] Bed Mobility Training             []    Neuromuscular Re-Education  [x]           Transfer Training                   []    Orthotic/Prosthetic Training  [x]           Gait Training                         []    Modalities  [x]           Therapeutic Exercises           []    Edema Management/Control  [x]           Therapeutic Activities            [x]    Patient and Family Training/Education  []           Other (comment):    Frequency/Duration: Patient will be followed by physical therapy  5 times a week to address goals. Discharge Recommendations: Healthcare FIELD [facility]  Further Equipment Recommendations for Discharge: rollator     SUBJECTIVE:   Patient stated I have a rollator.     OBJECTIVE DATA SUMMARY:   HISTORY:    Past Medical History:   Diagnosis Date    Arthritis     spine, hip    CAD (coronary artery disease)     bypass surgery 22 years ago, 1988    Chronic pain     right hip    DNR (do not resuscitate) 11/26/2014 11/26/14    GERD (gastroesophageal reflux disease)     Glaucoma     Hard of hearing     Hip arthritis 12/5/2011    HTN (hypertension) 9/13/2011    Hyponatremia 11/28/2011    Osteoporosis     Overactive bladder     Peripheral neuropathy 9/13/2011    Pneumonia     Psychiatric disorder     anxiety    Syncope 9/13/2011    Thrombocytopenia ([facility] [address] 11/6/2013    Unspecified adverse effect of anesthesia     PT REQUESTS SPINAL ANESTHESIA , TO BE DISCUSSED WITH ANESTHESIOLOGIST DOS     Past Surgical History:   Procedure Laterality Date    ABDOMEN SURGERY PROC UNLISTED      spgellian, ventral, left inguinal repairs.     CARDIAC SURG PROCEDURE UNLIST  1988    CABG    ENDOSCOPY, COLON, DIAGNOSTIC  11/16/09    diverticulosis, f/u prn    HX COLONOSCOPY      HX CORONARY ARTERY BYPASS GRAFT      HX GI  2010    Ventral abd, herniorrhaphy with mesh, Lt IH     HX HEENT      both eyes 2009, cataract surgery    HX LUMBAR LAMINECTOMY      for spinal stenosis    HX ORTHOPAEDIC      back ,BILATERAL knee replacement, shoulder    HX ORTHOPAEDIC      right shoulder decompression    HX ORTHOPAEDIC      RT HIP    HX OTHER SURGICAL      right carotid endarterectomy    HX TONSILLECTOMY       Prior Level of Function/Home Situation: Mod I with rollator- living at [facility]  Personal factors and/or comorbidities impacting plan of care:     Home Situation  Home Environment: Assisted living  [address] Name: [facility]  # Steps to Enter: 0  One/Two Story Residence: Other (Comment) (3 story)  # of Interior Steps: 0  Living Alone: Yes  Support Systems: Assisted living  Patient Expects to be Discharged to[de-identified] Assisted living  Current DME Used/Available at Home: [employer], rollator  Tub or Shower Type: Shower    EXAMINATION/PRESENTATION/DECISION MAKING:   Critical Behavior:  Neurologic State: Alert  Orientation Level: Oriented X4  Cognition: Appropriate for age attention/concentration  Safety/Judgement: Good awareness of safety precautions  Hearing: Auditory  Auditory Impairment: Hard of hearing, bilateral    Range Of Motion:  AROM: Within functional limits           PROM: Within functional limits           Strength:    Strength: Within functional limits                    Tone & Sensation:   Tone: Normal              Sensation: Intact               Coordination:  Coordination: Within functional limits  Vision:   Acuity: Within Defined Limits  Functional Mobility:  Bed Mobility:     Supine to Sit: Contact guard assistance  Sit to Supine:  (remained in chair)     Transfers:  Sit to Stand: Moderate assistance;Assist x2 (remained in chair)           Bed to Chair: Moderate assistance;Assist x2              Balance:   Sitting: Intact  Standing: Impaired; With support  Standing - Static: Poor  Standing - Dynamic : Poor  Ambulation/Gait Training:                                          Functional Measure:  Timed up and go:    Timed Get Up And Go Test: 0 (unable)     Timed Up and Go and G-code impairment scale:  Percentage of Impairment CH    0%   CI    1-19% CJ    20-39% CK    40-59% CL    60-79% CM    80-99% CN     100%   Timed   Score 0-56 10 11-12 13-14 15-16 17-18 19 20       < than 10 seconds=Normal  Greater then 13.5 seconds (in elderly)=Increased fall risk   Medardo Moraes, Lucia DARBY. Predicting the probability for falls in community dwelling older adults using the Timed Up and Go Test. Phys Ther. 2000;80:896-903. G codes: In compliance with CMSs Claims Based Outcome Reporting, the following G-code set was chosen for this patient based on their primary functional limitation being treated: The outcome measure chosen to determine the severity of the functional limitation was the TUG with a score of 0/unable which was correlated with the impairment scale. ? Mobility - Walking and Moving Around:     - CURRENT STATUS: CN - 100% impaired, limited or restricted    - GOAL STATUS: CM - 80%-99% impaired, limited or restricted    - D/C STATUS:  ---------------To be determined---------------      Physical Therapy Evaluation Charge Determination   History Examination Presentation Decision-Making   HIGH Complexity :3+ comorbidities / personal factors will impact the outcome/ POC  MEDIUM Complexity : 3 Standardized tests and measures addressing body structure, function, activity limitation and / or participation in recreation  LOW Complexity : Stable, uncomplicated  MEDIUM Complexity : FOTO score of 26-74      Based on the above components, the patient evaluation is determined to be of the following complexity level: LOW     Pain:  Pain Scale 1: Numeric (0 - 10)  Pain Intensity 1: 0              Activity Tolerance:   VSS  Please refer to the flowsheet for vital signs taken during this treatment.   After treatment:   [x]         Patient left in no apparent distress sitting up in chair  []         Patient left in no apparent distress in bed  [x]         Call bell left within reach  [x] Nursing notified  [x]         Caregiver present  []         Bed alarm activated    COMMUNICATION/EDUCATION:   The patients plan of care was discussed with: Occupational Therapist and Registered Nurse. [x]         Fall prevention education was provided and the patient/caregiver indicated understanding. [x]         Patient/family have participated as able in goal setting and plan of care. [x]         Patient/family agree to work toward stated goals and plan of care. []         Patient understands intent and goals of therapy, but is neutral about his/her participation. []         Patient is unable to participate in goal setting and plan of care.     Thank you for this referral.  Austen Celis, PT , DPT   Time Calculation: 35 mins

## 2017-12-08 NOTE — PHYSICIAN ADVISORY
Letter of Status Determination:   Recommend hospitalization status upgraded from   OBSERVATION  to INPATIENT  Status     Pt Name:  Irina Olvera   MR#   LINUS # [id number] /  [id number]   [facility]#  [id number]   Room and Hospital  656/01  @ [facility]   Hospitalization date  12/6/2017 11:22 AM   Current Attending Physician  Kaila Farias IV, *   Principal diagnosis  <principal problem not specified>   Syncope    Clinicals  80 y.o. y.o  male hospitalized with above diagnosis   The pt is now suspected to have had an adverse reaction to escalation of his anti depressant medication. It is also noted that he is being started on AntiParkinsonian treatment. [place] ([facility]) criteria   Does  NOT apply    STATUS DETERMINATION  This patient is at above high risk of deterioration based on documented presenting clinical data, comorbid conditions, high risk of adverse events and current acute care course. Mr. Sarina Rashid III now meets Inpatient Admission status criteria in accordance with CMS regulation Section 43 .3. Specifically, due to medical necessity the patient's stay now exceeds Two Midnights. It is our recommendation that this patient's hospitalization status should be upgraded from  OBSERVATION to INPATIENT status. The final decision of the patient's hospitalization status depends on the attending physician's judgment            Additional comments     Payor: Suad Madsen / Plan: [address] / Product Type: Medicare /         Larry Faria MD MPH FACP     Physician [address]   President Medical Staff, [address] [place] [address]    Cell  [phone]        [phone]    .

## 2017-12-08 NOTE — PROGRESS NOTES
Problem: Self Care Deficits Care Plan (Adult)  Goal: *Acute Goals and Plan of Care (Insert Text)  Occupational Therapy Goals  Initiated: 12/8/2017    1. Patient will perform grooming with supervision/set-up sitting in chair within 7 day(s). 2.  Patient will perform bathing with min A from chair within 7 day(s). 3.  Patient will perform upper body dressing and lower body dressing with min A within 7 day(s). 4.  Patient will perform toilet transfers with CGA within 7 day(s). 5.  Patient will perform all aspects of toileting with min A within 7 day(s). Occupational Therapy EVALUATION  Patient: Thuy Powers III (45 y.o. male)  Date: 12/8/2017  Primary Diagnosis: Syncope  Syncope        Precautions:   Fall    ASSESSMENT :  Based on the objective data described below, the patient presents with decreased independence with self care and functional mobility following admission for syncopal episode. Pt was found slumped over his walker and unresponsive in the bathroom. Pt is feeling better and alert and cooperative today. He was able to progress OOB to [facility] for bowel movement and then to transfer to chair following intervention. Pt is very pleasant and very eager to work with therapist in the hospital.  Pt asking therapist to go home with him as well. Recommend return to healthcare setting at [facility]. .    Patient will benefit from skilled intervention to address the above impairments.   Patients rehabilitation potential is considered to be Good  Factors which may influence rehabilitation potential include:   [x]             None noted  []             Mental ability/status  []             Medical condition  []             Home/family situation and support systems  [x]             Safety awareness  []             Pain tolerance/management  []             Other:      PLAN :  Recommendations and Planned Interventions:  [x]               Self Care Training                  [x]        Therapeutic Activities  [x] Functional Mobility Training    []        Cognitive Retraining  [x]               Therapeutic Exercises           [x]        Endurance Activities  [x]               Balance Training                   []        Neuromuscular Re-Education  []               Visual/Perceptual Training     [x]   Home Safety Training  [x]               Patient Education                 [x]        Family Training/Education  []               Other (comment):    Frequency/Duration: Patient will be followed by occupational therapy 5 times a week to address goals. Discharge Recommendations: Rehab  Further Equipment Recommendations for Discharge: TBD     SUBJECTIVE:   Patient stated I am willing to try.     OBJECTIVE DATA SUMMARY:   HISTORY:   Past Medical History:   Diagnosis Date    Arthritis     spine, hip    CAD (coronary artery disease)     bypass surgery 22 years ago, 1988    Chronic pain     right hip    DNR (do not resuscitate) 11/26/2014 11/26/14    GERD (gastroesophageal reflux disease)     Glaucoma     Hard of hearing     Hip arthritis 12/5/2011    HTN (hypertension) 9/13/2011    Hyponatremia 11/28/2011    Osteoporosis     Overactive bladder     Peripheral neuropathy 9/13/2011    Pneumonia     Psychiatric disorder     anxiety    Syncope 9/13/2011    Thrombocytopenia ([address] 11/6/2013    Unspecified adverse effect of anesthesia     PT REQUESTS SPINAL ANESTHESIA , TO BE DISCUSSED WITH ANESTHESIOLOGIST DOS     Past Surgical History:   Procedure Laterality Date    ABDOMEN SURGERY PROC UNLISTED      spgellian, ventral, left inguinal repairs.     CARDIAC SURG PROCEDURE UNLIST  1988    CABG    ENDOSCOPY, COLON, DIAGNOSTIC  11/16/09    diverticulosis, f/u prn    HX COLONOSCOPY      HX CORONARY ARTERY BYPASS GRAFT      HX GI  2010    Ventral abd, herniorrhaphy with mesh, Lt IH     HX HEENT      both eyes 2009, cataract surgery    HX LUMBAR LAMINECTOMY      for spinal stenosis    HX ORTHOPAEDIC      back ,BILATERAL knee replacement, shoulder    HX ORTHOPAEDIC      right shoulder decompression    HX ORTHOPAEDIC      RT HIP    HX OTHER SURGICAL      right carotid endarterectomy    HX TONSILLECTOMY         Prior Level of Function/Environment/Context: pt was independent prior 3 weeks ago. He then had a fall with rib fracture and diagnosis with TIA. Occupations in which the patient is/was successful, what are the barriers preventing that success:   Performance Patterns (routines, roles, habits, and rituals):   Personal Interests and/or values:   Expanded or extensive additional review of patient history:     Home Situation  [facility] [address] Name: [facility]  # Steps to Enter: 0  One/Two Story Residence: Other (Comment) (3 story)  # of Interior Steps: 0  Living Alone: Yes  Support Systems: Assisted living  Patient Expects to be Discharged to[de-identified] Assisted living  Current DME Used/Available at Home: [employer], rollator  Tub or Shower Type: Shower  [x]  Right hand dominant   []  Left hand dominant    EXAMINATION OF PERFORMANCE DEFICITS:  Cognitive/Behavioral Status:  Neurologic State: Alert  Orientation Level: Oriented X4  Cognition: Appropriate for age attention/concentration  Perception: Appears intact  Perseveration: No perseveration noted  Safety/Judgement: Good awareness of safety precautions    Skin: see nursing notes    Edema: none noted    Hearing: Auditory  Auditory Impairment: Hard of hearing, bilateral    Vision/Perceptual:                           Acuity: Within Defined Limits         Range of Motion:    AROM: Within functional limits  PROM: Within functional limits                      Strength:    Strength: Within functional limits                Coordination:  Coordination: Within functional limits  Fine Motor Skills-Upper: Right Intact; Left Intact    Gross Motor Skills-Upper: Left Impaired;Right Impaired    Tone & Sensation:    Tone: Normal  Sensation: Intact Balance:  Sitting: Intact  Standing: Impaired; With support  Standing - Static: Poor  Standing - Dynamic : Poor    Functional Mobility and Transfers for ADLs:  Bed Mobility:  Supine to Sit: Contact guard assistance  Sit to Supine:  (remained in chair)    Transfers:  Sit to Stand: Moderate assistance;Assist x2 (remained in chair)  Bed to Chair: Moderate assistance;Assist x2  Toilet Transfer : Moderate assistance;Assist x2 (BSC)    ADL Assessment:  Feeding: Supervision    Oral Facial Hygiene/Grooming: Supervision    Bathing: Maximum assistance    Upper Body Dressing: Moderate assistance    Lower Body Dressing: Maximum assistance    Toileting: Maximum assistance;Assist x2                ADL Intervention and task modifications:     Pt progressed from supine in bed to [facility] for large bowel movement and then transfer to chair. He needs cues and instructions for safety with all activity. Cognitive Retraining  Safety/Judgement: Good awareness of safety precautions    Functional Measure:  Barthel Index:    Bathin  Bladder: 5  Bowels: 10  Groomin  Dressin  Feeding: 10  Mobility: 0  Stairs: 0  Toilet Use: 5  Transfer (Bed to Chair and Back): 5  Total: 45       Barthel and G-code impairment scale:  Percentage of impairment CH  0% CI  1-19% CJ  20-39% CK  40-59% CL  60-79% CM  80-99% CN  100%   Barthel Score 0-100 100 99-80 79-60 59-40 20-39 1-19   0   Barthel Score 0-20 20 17-19 13-16 9-12 5-8 1-4 0      The Barthel ADL Index: Guidelines  1. The index should be used as a record of what a patient does, not as a record of what a patient could do. 2. The main aim is to establish degree of independence from any help, physical or verbal, however minor and for whatever reason. 3. The need for supervision renders the patient not independent. 4. A patient's performance should be established using the best available evidence.  Asking the patient, friends/relatives and nurses are the usual sources, but direct observation and common sense are also important. However direct testing is not needed. 5. Usually the patient's performance over the preceding 24-48 hours is important, but occasionally longer periods will be relevant. 6. Middle categories imply that the patient supplies over 50 per cent of the effort. 7. Use of aids to be independent is allowed. Katy Morales., Barthel, D.W. (3096). Functional evaluation: the Barthel Index. [address] (14)2. CODY Garcia, Oscar Joshua., Pradeep Varner., [place], [facility] (1999). Measuring the change indisability after inpatient rehabilitation; comparison of the responsiveness of the Barthel Index and Functional [place] Measure. Journal of Neurology, Neurosurgery, and Psychiatry, 66(4), 329-901. DARRYL Fonseca, HUONG Nair, & Blaise Schirmer, M.A. (2004.) Assessment of post-stroke quality of life in cost-effectiveness studies: The usefulness of the Barthel Index and the EuroQoL-5D. Quality of Life Research, 13, 634-10     G codes: In compliance with CMSs Claims Based Outcome Reporting, the following G-code set was chosen for this patient based on their primary functional limitation being treated: The outcome measure chosen to determine the severity of the functional limitation was the Barthel Index with a score of 45/100 which was correlated with the impairment scale. ?  Self Care:     - CURRENT STATUS: CK - 40%-59% impaired, limited or restricted    - GOAL STATUS: CI - 1%-19% impaired, limited or restricted    - D/C STATUS:  ---------------To be determined---------------     Occupational Therapy Evaluation Charge Determination   History Examination Decision-Making   LOW Complexity : Brief history review  HIGH Complexity : 5 or more performance deficits relating to physical, cognitive , or psychosocial skils that result in activity limitations and / or participation restrictions HIGH Complexity : Patient presents with comorbidities that affect occupational performance. Signifigant modification of tasks or assistance (eg, physical or verbal) with assessment (s) is necessary to enable patient to complete evaluation       Based on the above components, the patient evaluation is determined to be of the following complexity level: LOW   Pain:  Pain Scale 1: Numeric (0 - 10)  Pain Intensity 1: 0              Activity Tolerance:   VSS throughout session. After treatment:   [x] Patient left in no apparent distress sitting up in chair  [] Patient left in no apparent distress in bed  [x] Call bell left within reach  [x] Nursing notified  [] Caregiver present  [x] Bed alarm activated    COMMUNICATION/EDUCATION:   The patients plan of care was discussed with: Physical Therapist and Registered Nurse. [x] Home safety education was provided and the patient/caregiver indicated understanding. [x] Patient/family have participated as able in goal setting and plan of care. [] Patient/family agree to work toward stated goals and plan of care. [] Patient understands intent and goals of therapy, but is neutral about his/her participation. [] Patient is unable to participate in goal setting and plan of care. This patients plan of care is appropriate for delegation to [facility].     Thank you for this referral.  Raiza Willis OT  Time Calculation: 35 mins

## 2017-12-08 NOTE — INTERDISCIPLINARY ROUNDS
IDR/SLIDR Summary          Patient: Rochelle Shafer III MRN: [id number]    Age: 80 y.o. YOB: 1927 Room/Bed: [facility]   Admit Diagnosis: Syncope  Principal Diagnosis: <principal problem not specified>   Goals:   Readmission: NO  Quality Measure:   VTE Prophylaxis: Mechanical  Influenza Vaccine screening completed? YES  Pneumococcal Vaccine screening completed? YES  Mobility needs: Yes   Nutrition plan:Yes  Consults:P.T, O.T. and Case Management    Financial concerns:  Escalated to CM? YES  RRAT Score: 10   Interventions:  Testing due for pt today?  NO  LOS: 0 days Expected length of stay 1 days  Discharge plan: Pending   PCP: Everardo Horn MD  Transportation needs: Yes    Days before discharge:one day until discharge   Discharge disposition: SNF    Signed:     Isaías Jaimes  12/8/2017  3:20 AM

## 2017-12-08 NOTE — PROGRESS NOTES
PCP f/u note. Pt drowsy this afternoon. bp down to 95/52. P: check labs, add NS IV at 100 mL /hr. Hold Sinemet for low bp . Follow clinically.

## 2017-12-08 NOTE — PROGRESS NOTES
Problem: Falls - Risk of  Goal: *Absence of Falls  Document Ritu Fall Risk and appropriate interventions in the flowsheet.    Outcome: Progressing Towards Goal  Fall Risk Interventions:  Mobility Interventions: Assess mobility with egress test, Communicate number of staff needed for ambulation/transfer, PT Consult for mobility concerns, PT Consult for assist device competence, Utilize walker, cane, or other assitive device, Utilize gait belt for transfers/ambulation         Medication Interventions: Assess postural VS orthostatic hypotension, Evaluate medications/consider consulting pharmacy, Patient to call before getting OOB, Teach patient to arise slowly    Elimination Interventions: Bed/chair exit alarm, Call light in reach, Patient to call for help with toileting needs, Toileting schedule/hourly rounds    History of Falls Interventions: Bed/chair exit alarm, Evaluate medications/consider consulting pharmacy, Utilize gait belt for transfer/ambulation

## 2017-12-08 NOTE — CONSULTS
Neurology Progress Note    Patient ID:  Edgardo Kessler  [id number]  1 y.o.  2/19/1927    Chief Complaint: Poorly responsive episode    Subjective:     80-year-old gentleman was admitted after being found poorly responsive with low blood pressure. MRI was completed which shows expected age-related changes and atrophy. Sinemet was initiated yesterday and he has responded well.     Objective:       ROS:  Cannot obtain secondary to patient medical condition      Meds:  Current Facility-Administered Medications   Medication Dose Route Frequency    buPROPion SR (WELLBUTRIN SR) tablet 100 mg  100 mg Oral DAILY    buPROPion (WELLBUTRIN) tablet 50 mg  50 mg Oral PCD    famotidine (PEPCID) tablet 20 mg  20 mg Oral DAILY    carbidopa-levodopa (SINEMET)  mg per tablet 1 Tab  1 Tab Oral TID    acetaminophen (TYLENOL) tablet 325 mg  325 mg Oral DAILY    acetaminophen (TYLENOL) tablet 650 mg  650 mg Oral QHS    baicalin-catechin 500 mg cap 500 mg (Patient Supplied)  500 mg Oral BID    clopidogrel (PLAVIX) tablet 75 mg  75 mg Oral DAILY    doxazosin (CARDURA) tablet 1 mg  1 mg Oral DAILY    guaiFENesin ER (MUCINEX) tablet 600 mg  600 mg Oral Q12H PRN    mirabegron ER (MYRBETRIQ) tablet 25 mg (Patient Supplied)  25 mg Oral QHS    nitroglycerin (NITROSTAT) tablet 0.4 mg  0.4 mg SubLINGual Q5MIN PRN    polyethylene glycol (MIRALAX) packet 17 g  17 g Oral DAILY PRN    timolol (TIMOPTIC) 0.5 % ophthalmic solution 1 Drop  1 Drop Left Eye DAILY    sodium chloride (NS) flush 5-10 mL  5-10 mL IntraVENous Q8H    sodium chloride (NS) flush 5-10 mL  5-10 mL IntraVENous PRN    acetaminophen (TYLENOL) tablet 650 mg  650 mg Oral Q4H PRN    therapeutic multivitamin (THERAGRAN) tablet 1 Tab  1 Tab Oral DAILY    azelastine (ASTELIN) 137mcg/spray nasal spray  1 Spray Both Nostrils BID    metoprolol succinate (TOPROL-XL) XL tablet 25 mg  25 mg Oral DAILY WITH BREAKFAST    latanoprost (XALATAN) 0.005 % ophthalmic solution 1 Drop  1 Drop Left Eye [facility] [place]       MRI Results (maximum last 3): Results from [place] encounter on 12/06/17   MRA NECK W CONT   Narrative EXAM:  MRA NECK W CONT, MRA BRAIN WO CONT  INDICATION:  altered mental status  TECHNIQUE: Axial 3-D time-of-flight MR angiography was performed of the cranial  base and proximal intracranial vessels. During intravenous bolus infusion 20 mL  of Gadavist 3-D slab MR angiography was performed from aortic arch to the skull  base. MIP reconstructions were performed of both data sets. COMPARISON: MRA head 6/22/15  FINDINGS:  Normal origins of the brachycephalic arteries from the arch. Small vertebral basilar system due to bilateral fetal origins of the posterior  cerebral arteries. Possible mild stenosis at the origin of the right vertebral artery. Vertebral  arteries are similar in size and both supply the basilar artery. The basilar  artery is normal. Small left precommunicating P1 segment suggested. The common carotid arteries, carotid bifurcations and cervical internal carotid  arteries are unremarkable without evidence of stenosis. 0% stenosis by NASCET  criteria. Carotid siphons have mild irregularity without stenosis. Symmetric flow in middle cerebral arteries. Again noted is right A1 segment  dominant supply to the anterior cerebral arteries. Impression IMPRESSION:  1. Possible mild stenosis origin right vertebral artery. 2. Otherwise normal MRA of arch, neck and head arteries. MRA BRAIN WO CONT   Narrative EXAM:  MRA NECK W CONT, MRA BRAIN WO CONT  INDICATION:  altered mental status  TECHNIQUE: Axial 3-D time-of-flight MR angiography was performed of the cranial  base and proximal intracranial vessels. During intravenous bolus infusion 20 mL  of Gadavist 3-D slab MR angiography was performed from aortic arch to the skull  base. MIP reconstructions were performed of both data sets.    COMPARISON: MRA head 6/22/15  FINDINGS:  Normal origins of the brachycephalic arteries from the arch. Small vertebral basilar system due to bilateral fetal origins of the posterior  cerebral arteries. Possible mild stenosis at the origin of the right vertebral artery. Vertebral  arteries are similar in size and both supply the basilar artery. The basilar  artery is normal. Small left precommunicating P1 segment suggested. The common carotid arteries, carotid bifurcations and cervical internal carotid  arteries are unremarkable without evidence of stenosis. 0% stenosis by NASCET  criteria. Carotid siphons have mild irregularity without stenosis. Symmetric flow in middle cerebral arteries. Again noted is right A1 segment  dominant supply to the anterior cerebral arteries. Impression IMPRESSION:  1. Possible mild stenosis origin right vertebral artery. 2. Otherwise normal MRA of arch, neck and head arteries. MRI BRAIN W WO CONT   Narrative EXAM:  MRI BRAIN W WO CONT  INDICATION:  Altered Mental Status  TECHNIQUE: Sagittal T1, axial FLAIR, T2, T1 and gradient echo T2-weighted images  of the head were obtained followed by intravenous infusion 16  mL MultiHance  repeat axial and coronal T1-weighted images and axial diffusion weighted images. COMPARISON: CT head 12/6/17, MRI 7/22/15 and 4/4/06  FINDINGS:  Generalized prominence of ventricles and sulci consistent with cerebral volume  loss greater than expected for age. Multifocal and confluent T2 hyperintensity in the cerebral white matter and  brainstem in pattern suggesting chronic small vessel type ischemic change. This  is also greater than expected. No abnormal intracranial enhancement or abnormal restricted diffusion that would  suggest acute or subacute brain infarction. No evidence of intracranial hemorrhage, mass or abnormal extra-axial fluid  collections. Flow voids are present in the vertebral, basilar and carotid artery systems.     The craniocervical junction is normal.   The structures of the cranial base including paranasal sinuses are   unremarkable. Impression IMPRESSION:   1. Cerebral volume loss and white matter T2 hyperintensity greater than expected  and associated with continuous progression when compared to MRIs since 2006. 2. No acute intracranial abnormality demonstrated. Lab Review No results found for this or any previous visit (from the past 24 hour(s)). Additional comments:I reviewed the patient's new clinical lab test results. and I reviewed the patients new imaging test results. Patient Vitals for the past 8 hrs:   BP Temp Pulse Resp SpO2   12/08/17 1100 157/70 97.8 °F (36.6 °C) 64 15 98 %   12/08/17 0700 179/72 97.5 °F (36.4 °C) 61 18 98 %       12/08 0701 - 12/08 1900  In: 110 [P.O.:110]  Out: 100 [Urine:100]  12/06 1901 - 12/08 0700  In: -   Out: 1025 [Urine:1025]    Exam:  Visit Vitals    /70 (BP 1 Location: Right arm, BP Patient Position: At rest)    Pulse 64    Temp 97.8 °F (36.6 °C)    Resp 15    Ht 5' 6\" (1.676 m)    Wt 81.3 kg (179 lb 3.2 oz)    SpO2 98%    BMI 28.92 kg/m2     Gen: Well developed  CV: RRR  Lungs: non labored breathing  Abd: soft, non distended  Neuro: Awake and alert. Having breakfast.  Able to use a fork and knife to cut meat slowly but well he appears less bradykinetic today.   CN II-XII: PERRL, face symmetric, tongue/palate midline  Motor: Globally weak 4+  Sensory: intact to LT  DTRs: symmetric  COOR: no limb/truncal ataxia  Gait: Deferred    PROBLEM LIST:     Patient Active Problem List   Diagnosis Code    Peripheral neuropathy G62.9    CAD (coronary artery disease) I25.10    HTN (hypertension) I10    Hyperlipidemia E78.5    DJD (degenerative joint disease) M19.90    Hyponatremia E87.1    Anxiety state, unspecified F41.1    Overactive bladder N32.81    Thrombocytopenia (HCC) D69.6    Inguinal hernia unilateral, non-recurrent K40.90    DNR (do not resuscitate) Z66    CVA (cerebral infarction) I63.9    Altered mental status R41.82    Unresponsive episode R41.89    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 He is responding to very low-dose carbidopa which in many cases is diagnostic. Recommend continuing carbidopa 10100 mg 3 times daily for now. I can see him in follow-up in the outpatient clinic. I discussed the MRI results with his daughter at the bedside. No acute issue. Neurology will sign off. Please call with any questions. During this evaluation, we also discussed stroke education to include signs and symptoms of stroke and TIA.        Signed:  [address] [facility] Isha, DO  12/8/2017  11:21 AM

## 2017-12-09 VITALS
HEART RATE: 66 BPM | DIASTOLIC BLOOD PRESSURE: 77 MMHG | BODY MASS INDEX: 29.72 KG/M2 | RESPIRATION RATE: 21 BRPM | WEIGHT: 184.9 LBS | TEMPERATURE: 97.7 F | SYSTOLIC BLOOD PRESSURE: 154 MMHG | OXYGEN SATURATION: 99 % | HEIGHT: 66 IN

## 2017-12-09 LAB
ANION GAP SERPL CALC-SCNC: 7 MMOL/L (ref 5–15)
BASOPHILS # BLD: 0 K/UL (ref 0–0.1)
BASOPHILS NFR BLD: 0 % (ref 0–1)
BUN SERPL-MCNC: 20 MG/DL (ref 6–20)
BUN/CREAT SERPL: 20 (ref 12–20)
CALCIUM SERPL-MCNC: 8.7 MG/DL (ref 8.5–10.1)
CHLORIDE SERPL-SCNC: 102 MMOL/L (ref 97–108)
CO2 SERPL-SCNC: 28 MMOL/L (ref 21–32)
CREAT SERPL-MCNC: 0.98 MG/DL (ref 0.7–1.3)
EOSINOPHIL # BLD: 0.4 K/UL (ref 0–0.4)
EOSINOPHIL NFR BLD: 5 % (ref 0–7)
ERYTHROCYTE [DISTWIDTH] IN BLOOD BY AUTOMATED COUNT: 13.5 % (ref 11.5–14.5)
GLUCOSE SERPL-MCNC: 90 MG/DL (ref 65–100)
HCT VFR BLD AUTO: 38.7 % (ref 36.6–50.3)
HGB BLD-MCNC: 13.3 G/DL (ref 12.1–17)
LYMPHOCYTES # BLD: 2.9 K/UL (ref 0.8–3.5)
LYMPHOCYTES NFR BLD: 37 % (ref 12–49)
MCH RBC QN AUTO: 33.9 PG (ref 26–34)
MCHC RBC AUTO-ENTMCNC: 34.4 G/DL (ref 30–36.5)
MCV RBC AUTO: 98.7 FL (ref 80–99)
MONOCYTES # BLD: 0.9 K/UL (ref 0–1)
MONOCYTES NFR BLD: 11 % (ref 5–13)
NEUTS SEG # BLD: 3.6 K/UL (ref 1.8–8)
NEUTS SEG NFR BLD: 47 % (ref 32–75)
PLATELET # BLD AUTO: 179 K/UL (ref 150–400)
POTASSIUM SERPL-SCNC: 3.9 MMOL/L (ref 3.5–5.1)
RBC # BLD AUTO: 3.92 M/UL (ref 4.1–5.7)
SODIUM SERPL-SCNC: 137 MMOL/L (ref 136–145)
WBC # BLD AUTO: 7.7 K/UL (ref 4.1–11.1)

## 2017-12-09 PROCEDURE — 36415 COLL VENOUS BLD VENIPUNCTURE: CPT | Performed by: INTERNAL MEDICINE

## 2017-12-09 PROCEDURE — 74011250637 HC RX REV CODE- 250/637: Performed by: INTERNAL MEDICINE

## 2017-12-09 PROCEDURE — 85025 COMPLETE CBC W/AUTO DIFF WBC: CPT | Performed by: INTERNAL MEDICINE

## 2017-12-09 PROCEDURE — 80048 BASIC METABOLIC PNL TOTAL CA: CPT | Performed by: INTERNAL MEDICINE

## 2017-12-09 PROCEDURE — 74011250636 HC RX REV CODE- 250/636: Performed by: INTERNAL MEDICINE

## 2017-12-09 RX ORDER — DOCUSATE SODIUM 100 MG/1
100 CAPSULE, LIQUID FILLED ORAL 2 TIMES DAILY
Qty: 60 CAP | Refills: 2 | Status: SHIPPED | OUTPATIENT
Start: 2017-12-09 | End: 2018-03-09

## 2017-12-09 RX ORDER — CARBIDOPA AND LEVODOPA 10; 100 MG/1; MG/1
1 TABLET ORAL 3 TIMES DAILY
Qty: 90 TAB | Refills: 5 | Status: SHIPPED | OUTPATIENT
Start: 2017-12-09 | End: 2018-08-24 | Stop reason: DRUGHIGH

## 2017-12-09 RX ADMIN — CLOPIDOGREL BISULFATE 75 MG: 75 TABLET ORAL at 09:05

## 2017-12-09 RX ADMIN — DOXAZOSIN 1 MG: 1 TABLET ORAL at 09:05

## 2017-12-09 RX ADMIN — THERA TABS 1 TABLET: TAB at 09:05

## 2017-12-09 RX ADMIN — Medication 10 ML: at 06:22

## 2017-12-09 RX ADMIN — TIMOLOL MALEATE 1 DROP: 5 SOLUTION OPHTHALMIC at 09:06

## 2017-12-09 RX ADMIN — SODIUM CHLORIDE 100 ML/HR: 900 INJECTION, SOLUTION INTRAVENOUS at 03:41

## 2017-12-09 RX ADMIN — CARBIDOPA AND LEVODOPA 1 TABLET: 10; 100 TABLET ORAL at 09:05

## 2017-12-09 RX ADMIN — CARBIDOPA AND LEVODOPA 1 TABLET: 10; 100 TABLET ORAL at 16:18

## 2017-12-09 RX ADMIN — METOPROLOL SUCCINATE 25 MG: 25 TABLET, EXTENDED RELEASE ORAL at 09:05

## 2017-12-09 RX ADMIN — BUPROPION HYDROCHLORIDE 100 MG: 100 TABLET, FILM COATED, EXTENDED RELEASE ORAL at 09:05

## 2017-12-09 RX ADMIN — POLYETHYLENE GLYCOL 3350 17 G: 17 POWDER, FOR SOLUTION ORAL at 12:03

## 2017-12-09 RX ADMIN — FAMOTIDINE 20 MG: 20 TABLET ORAL at 09:05

## 2017-12-09 NOTE — PROGRESS NOTES
Problem: Falls - Risk of  Goal: *Absence of Falls  Document Ritu Fall Risk and appropriate interventions in the flowsheet.    Outcome: Progressing Towards Goal  Fall Risk Interventions:  Mobility Interventions: Communicate number of staff needed for ambulation/transfer, OT consult for ADLs, Patient to call before getting OOB, PT Consult for mobility concerns, PT Consult for assist device competence, Utilize walker, cane, or other assitive device, Utilize gait belt for transfers/ambulation         Medication Interventions: Evaluate medications/consider consulting pharmacy, Patient to call before getting OOB, Teach patient to arise slowly, Utilize gait belt for transfers/ambulation    Elimination Interventions: Call light in reach, Patient to call for help with toileting needs, Toileting schedule/hourly rounds    History of Falls Interventions: Bed/chair exit alarm, Consult care management for discharge planning, Door open when patient unattended, Evaluate medications/consider consulting pharmacy, Investigate reason for fall

## 2017-12-09 NOTE — PROGRESS NOTES
I have reviewed discharge instructions with the patient and caregiver. The patient and caregiver verbalized understanding. Patient being discharged to [facility]. Home Medication with patient. Belongings were taken by children. Attempted to call report 7 times to [phone], [phone], [phone] and [phone]. Unable to get an answer. Left a message for Saida Rodriguez at [phone].

## 2017-12-09 NOTE — PROGRESS NOTES
Cm received a page that the patient is ready to return back to [facility]. Cm contacted [facility] and informed them the patient is being discharge by ambulance at 4pm and cm also left a message informing them of the discharge. Cm put a referral into [employer] with [facility] ambulance and they accepted the referral with a transport time of 4pm. Cm put the discharge packet on the patients thin chart. There were no other discharge needs identified at this time.

## 2017-12-09 NOTE — PROGRESS NOTES
Problem: Falls - Risk of  Goal: *Absence of Falls  Document Ritu Fall Risk and appropriate interventions in the flowsheet.    Outcome: Progressing Towards Goal  Fall Risk Interventions:  Mobility Interventions: Bed/chair exit alarm, Communicate number of staff needed for ambulation/transfer, OT consult for ADLs, Patient to call before getting OOB, PT Consult for mobility concerns, PT Consult for assist device competence, Strengthening exercises (ROM-active/passive), Utilize walker, cane, or other assitive device, Utilize gait belt for transfers/ambulation         Medication Interventions: Assess postural VS orthostatic hypotension, Bed/chair exit alarm, Evaluate medications/consider consulting pharmacy, Patient to call before getting OOB, Teach patient to arise slowly, Utilize gait belt for transfers/ambulation    Elimination Interventions: Bed/chair exit alarm, Call light in reach, Patient to call for help with toileting needs, Toileting schedule/hourly rounds, Toilet paper/wipes in reach    History of Falls Interventions: Bed/chair exit alarm, Consult care management for discharge planning, Door open when patient unattended, Evaluate medications/consider consulting pharmacy, Investigate reason for fall, Room close to nurse's station, Utilize gait belt for transfer/ambulation

## 2017-12-10 PROBLEM — I35.0 NONRHEUMATIC AORTIC VALVE STENOSIS: Status: ACTIVE | Noted: 2017-12-10

## 2018-08-24 ENCOUNTER — OFFICE VISIT (OUTPATIENT)
Dept: NEUROLOGY | Age: 83
End: 2018-08-24

## 2018-08-24 VITALS
HEIGHT: 66 IN | SYSTOLIC BLOOD PRESSURE: 150 MMHG | RESPIRATION RATE: 20 BRPM | DIASTOLIC BLOOD PRESSURE: 90 MMHG | HEART RATE: 76 BPM | OXYGEN SATURATION: 94 %

## 2018-08-24 DIAGNOSIS — R53.81 DEBILITY: ICD-10-CM

## 2018-08-24 DIAGNOSIS — F43.21 ADJUSTMENT DISORDER WITH DEPRESSED MOOD: ICD-10-CM

## 2018-08-24 DIAGNOSIS — G20 PRIMARY PARKINSONISM (HCC): Primary | ICD-10-CM

## 2018-08-24 RX ORDER — CARBIDOPA AND LEVODOPA 25; 100 MG/1; MG/1
TABLET, ORALLY DISINTEGRATING ORAL
Qty: 90 TAB | Refills: 3 | Status: SHIPPED | OUTPATIENT
Start: 2018-08-24 | End: 2021-08-16

## 2018-08-24 RX ORDER — BUPROPION HYDROCHLORIDE 75 MG/1
75 TABLET ORAL 2 TIMES DAILY
Qty: 60 TAB | Refills: 3 | Status: SHIPPED | OUTPATIENT
Start: 2018-08-24

## 2018-08-24 NOTE — PROGRESS NOTES
Mr. CHRISTIE [facility] is here to follow up CVA. Depression screening done on patient.

## 2018-08-24 NOTE — MR AVS SNAPSHOT
[facility] [address] [place] [address] 
[phone] Patient: Julianna Soto 
MRN: [id number] KCE:[id number] Visit Information Date & Time Provider Department Dept. Phone Encounter #  
 8/24/2018 11:40 AM Teddy Waters DO [facility] at [address] [id number] Follow-up Instructions Return in about 3 months (around 11/24/2018). Routing History Upcoming Health Maintenance Date Due DTaP/Tdap/Td series (1 - Tdap) 3/25/2005 GLAUCOMA SCREENING Q2Y 9/18/2017 MEDICARE YEARLY EXAM 3/14/2018 Influenza Age 5 to Adult 8/1/2018 Allergies as of 8/24/2018  Review Complete On: 8/24/2018 By: Cain Severino LPN Severity Noted Reaction Type Reactions Latex  10/10/2014    Rash Actonel [Risedronate]  09/13/2011    Unknown (comments) Augmentin [Amoxicillin-pot Clavulanate]  12/29/2014    Diarrhea Azithromycin  03/11/2014    Hives Crestor [Rosuvastatin]  09/13/2011    Myalgia Lescol [Fluvastatin]  09/13/2011    Myalgia Micardis [Telmisartan]  09/13/2011    Unknown (comments) Morphine  11/22/2011   Intolerance Nausea and Vomiting Prolia [Denosumab]  07/11/2014    Other (comments) Dental difficulties Sulfa (Sulfonamide Antibiotics)  09/13/2011    Unknown (comments) Zocor [Simvastatin]  09/13/2011    Myalgia Current Immunizations  Reviewed on 8/15/2014 Name Date Influenza Vaccine 10/10/2014, 9/13/2013, 9/12/2012 Pneumococcal Vaccine (Unspecified Type) 9/18/2010, 4/14/2006, 1/8/1997 Td 3/24/2005 Zoster Vaccine, Live 11/13/2006 Not reviewed this visit You Were Diagnosed With   
  
 Codes Comments Primary Parkinsonism ([facility]    -  Primary ICD-10-CM: G20 
ICD-9-CM: 332.0 Debility     ICD-10-CM: R53.81 ICD-9-CM: 799.3 Adjustment disorder with depressed mood     ICD-10-CM: F43.21 ICD-9-CM: 309.0 Vitals BP Pulse Resp Height(growth percentile) SpO2 Smoking Status 150/90 76 20 5' 6\" (1.676 m) 94% Former Smoker Vitals History Preferred Pharmacy Pharmacy Name Phone [address] [facility] [phone] Your Updated Medication List  
  
   
This list is accurate as of 8/24/18 12:08 PM.  Always use your most recent med list.  
  
  
  
  
 acetaminophen 325 mg tablet Commonly known as:  TYLENOL Take 1,000 mg by mouth every eight (8) hours as needed. amoxicillin 500 mg capsule Commonly known as:  AMOXIL Take 2,000 mg by mouth as needed. 1 hour prior to dental work Azelastine 0.15 % (205.5 mcg) nasal spray Commonly known as:  ASTEPRO  
1 Spray by Right Nostril route two (2) times daily as needed. * buPROPion  mg SR tablet Commonly known as:  Teresa Ramirez Take 100 mg by mouth two (2) times a day. * buPROPion 75 mg tablet Commonly known as:  [facility] Take 1 Tab by mouth two (2) times a day. carbidopa-levodopa  mg rapid dissolve tablet Commonly known as:  PARCOPA  
1 tablet waking, midday, dinnertime CENTRUM SILVER PO Take 1 Tab by mouth daily. clopidogrel 75 mg Tab Commonly known as:  PLAVIX Take 1 Tab by mouth daily. diclofenac 1 % Gel Commonly known as:  VOLTAREN Apply 4 g to affected area four (4) times daily as needed. Apply to both shoulders  
  
 doxazosin 1 mg tablet Commonly known as:  CARDURA Take 1 mg by mouth daily. hydrocortisone 2.5 % topical cream  
Commonly known as:  HYTONE Apply  to affected area daily as needed. use thin layer  
  
 ketoconazole 2 % topical cream  
Commonly known as:  NIZORAL Apply  to affected area two (2) times daily as needed for Skin Irritation. L-Methylfolate-Q71-Yksszomwqt tablet Commonly known as:  Clarisa Cuna Take 1 Tab by mouth daily. LIMBREL 500 mg Cap Generic drug:  baicalin-catechin Take 1 Tab by mouth two (2) times a day. loperamide 2 mg capsule Commonly known as:  IMODIUM Take 2 mg by mouth four (4) times daily as needed for Diarrhea. MIRALAX 17 gram/dose powder Generic drug:  polyethylene glycol Take 17 g by mouth daily as needed. MUCINEX 1,200 mg Ta12 ER tablet Generic drug:  guaiFENesin Take 600 mg by mouth every twelve (12) hours as needed. MYRBETRIQ 25 mg ER tablet Generic drug:  mirabegron ER Take 25 mg by mouth nightly. nitroglycerin 0.4 mg SL tablet Commonly known as:  NITROSTAT  
by SubLINGual route every five (5) minutes as needed. PEPCID 20 mg tablet Generic drug:  famotidine Take 20 mg by mouth two (2) times daily as needed. PANDA MILK OF MAGNESIA 400 mg/5 mL suspension Generic drug:  magnesium hydroxide Take 30 mL by mouth daily as needed for Constipation. timolol 0.5 % ophthalmic solution Commonly known as:  TIMOPTIC Administer 1 Drop to left eye daily. TOPROL XL 25 mg XL tablet Generic drug:  metoprolol succinate Take 25 mg by mouth daily. XALATAN 0.005 % ophthalmic solution Generic drug:  latanoprost  
Administer 1 Drop to left eye nightly. * Notice: This list has 2 medication(s) that are the same as other medications prescribed for you. Read the directions carefully, and ask your doctor or other care provider to review them with you. Prescriptions Printed Refills buPROPion (WELLBUTRIN) 75 mg tablet 3 Sig: Take 1 Tab by mouth two (2) times a day. Class: Print Route: Oral  
 carbidopa-levodopa (PARCOPA)  mg rapid dissolve tablet 3 Si tablet waking, midday, dinnertime Class: Print We Performed the Following REFERRAL TO PHYSICAL THERAPY [JAY92 Custom] Comments:  
 Address gait and falls prevention and strength improvement Follow-up Instructions Return in about 3 months (around 2018). Referral Information Referral ID Referred By Referred To  
  
 [id number] [facility] Not Available Visits Status Start Date End Date 1 New Request 8/24/18 8/24/19 If your referral has a status of pending review or denied, additional information will be sent to support the outcome of this decision. Patient Instructions A Healthy Lifestyle: Care Instructions Your Care Instructions A healthy lifestyle can help you feel good, stay at a healthy weight, and have plenty of energy for both work and play. A healthy lifestyle is something you can share with your whole family. A healthy lifestyle also can lower your risk for serious health problems, such as high blood pressure, heart disease, and diabetes. You can follow a few steps listed below to improve your health and the health of your family. Follow-up care is a key part of your treatment and safety. Be sure to make and go to all appointments, and call your doctor if you are having problems. It's also a good idea to know your test results and keep a list of the medicines you take. How can you care for yourself at home? · Do not eat too much sugar, fat, or fast foods. You can still have dessert and treats now and then. The goal is moderation. · Start small to improve your eating habits. Pay attention to portion sizes, drink less juice and soda pop, and eat more fruits and vegetables. ¨ Eat a healthy amount of food. A 3-ounce serving of meat, for example, is about the size of a deck of cards. Fill the rest of your plate with vegetables and whole grains. ¨ Limit the amount of soda and sports drinks you have every day. Drink more water when you are thirsty. ¨ Eat at least 5 servings of fruits and vegetables every day. It may seem like a lot, but it is not hard to reach this goal. A serving or helping is 1 piece of fruit, 1 cup of vegetables, or 2 cups of leafy, raw vegetables. Have an apple or some carrot sticks as an afternoon snack instead of a candy bar. Try to have fruits and/or vegetables at every meal. 
· Make exercise part of your daily routine. You may want to start with simple activities, such as walking, bicycling, or slow swimming. Try to be active 30 to 60 minutes every day. You do not need to do all 30 to 60 minutes all at once. For example, you can exercise 3 times a day for 10 or 20 minutes. Moderate exercise is safe for most people, but it is always a good idea to talk to your doctor before starting an exercise program. 
· Keep moving. Sebastián Sethi the lawn, work in the garden, or [employer]. Take the stairs instead of the elevator at work. · If you smoke, quit. People who smoke have an increased risk for heart attack, stroke, cancer, and other lung illnesses. Quitting is hard, but there are ways to boost your chance of quitting tobacco for good. ¨ Use nicotine gum, patches, or lozenges. ¨ Ask your doctor about stop-smoking programs and medicines. ¨ Keep trying. In addition to reducing your risk of diseases in the future, you will notice some benefits soon after you stop using tobacco. If you have shortness of breath or asthma symptoms, they will likely get better within a few weeks after you quit. · Limit how much alcohol you drink. Moderate amounts of alcohol (up to 2 drinks a day for men, 1 drink a day for women) are okay. But drinking too much can lead to liver problems, high blood pressure, and other health problems. Family health If you have a family, there are many things you can do together to improve your health. · Eat meals together as a family as often as possible. · Eat healthy foods. This includes fruits, vegetables, lean meats and dairy, and whole grains. · Include your family in your fitness plan.  Most people think of activities such as jogging or tennis as the way to fitness, but there are many ways you and your family can be more active. Anything that makes you breathe hard and gets your heart pumping is exercise. Here are some tips: 
¨ Walk to do errands or to take your child to school or the bus. ¨ Go for a family bike ride after dinner instead of watching TV. Where can you learn more? Go to http://emily-evon.info/. Enter X576 in the search box to learn more about \"A Healthy Lifestyle: Care Instructions. \" Current as of: December 7, 2017 Content Version: 11.7 © 5171-2044 [employer]. Care instructions adapted under license by [employer] (which disclaims liability or warranty for this information). If you have questions about a medical condition or this instruction, always ask your healthcare professional. [address] any warranty or liability for your use of this information. Introducing [facility]! Dear Mahad Jones: Thank you for requesting a [employer] account. Our records indicate that you already have an active [employer] account. You can access your account anytime at [email or web address] Did you know that you can access your hospital and ER discharge instructions at any time in [employer]? You can also review all of your test results from your hospital stay or ER visit. Additional Information If you have questions, please visit the Frequently Asked Questions section of the [employer] website at [email or web address] Remember, [employer] is NOT to be used for urgent needs. For medical emergencies, dial 911. Now available from your iPhone and Android! Please provide this summary of care documentation to your next provider. Your primary care clinician is listed as [id number] Blrickie IV. If you have any questions after today's visit, please call [phone].

## 2018-08-24 NOTE — PATIENT INSTRUCTIONS

## 2018-08-24 NOTE — PROGRESS NOTES
Chief Complaint   Patient presents with    Neurologic Problem     Parkinsonism, tearfulness       HPI    Mr. Lalita Nick is a 77-year-old gentleman whom I last saw at the end of 2017 when he was in the hospital for decreased responsiveness in the setting of hypotension. At that time an MRI was negative for acute process with the exception of some cerebral atrophy and chronic ischemic changes. I started him on low-dose carbidopa for his parkinsonism which has been helpful according to his daughter who is here with him now. I have not seen him since that time in the hospital.  What has changes that he was admitted to another local hospital recently and there is suspicion he had an acute stroke from that visit. He had some speech changes but is improving. He is back at his home at [facility]. Daughter is reporting that she has concerns about when the medication for Parkinson's given. She is also concerned that he is having some more increasing sadness and tearfulness. Apparently he is gone through a lot of emotional changes with peers passing away and family members moving [place]. He denies any hallucinations. No falls. He has been very debilitated since he left the last hospital admission and has not regained his strength to walk independently yet. Review of Systems   Constitutional: Positive for malaise/fatigue. Eyes: Negative for double vision. Gastrointestinal: Negative for constipation. Neurological: Positive for weakness. Psychiatric/Behavioral: Positive for depression. The patient is nervous/anxious. All other systems reviewed and are negative.       Past Medical History:   Diagnosis Date    Arthritis     spine, hip    CAD (coronary artery disease)     bypass surgery 22 years ago, 1988    Chronic pain     right hip    DNR (do not resuscitate) 11/26/2014 11/26/14    GERD (gastroesophageal reflux disease)     Glaucoma     Hard of hearing     Hip arthritis 12/5/2011    HTN (hypertension) 9/13/2011    Hyponatremia 11/28/2011    Nonrheumatic aortic valve stenosis 12/10/2017    Osteoporosis     Overactive bladder     Peripheral neuropathy 9/13/2011    Pneumonia     Psychiatric disorder     anxiety    Syncope 9/13/2011    Thrombocytopenia ([facility] [address] 11/6/2013    Unspecified adverse effect of anesthesia     PT REQUESTS SPINAL ANESTHESIA , TO BE DISCUSSED WITH ANESTHESIOLOGIST DOS     Family History   Problem Relation Age of Onset    Diabetes Brother     Heart Disease Brother     Heart Disease Father     Stroke Father      Social History     Social History    Marital status:      Spouse name: N/A    Number of children: N/A    Years of education: N/A     Occupational History    Not on file. Social History Main Topics    Smoking status: Former Smoker    Smokeless tobacco: Never Used    Alcohol use 0.5 oz/week     1 Glasses of wine per week      Comment: infrequent     Drug use: No    Sexual activity: Not on file     Other Topics Concern    Not on file     Social History Narrative     Allergies   Allergen Reactions    Latex Rash    Actonel [Risedronate] Unknown (comments)    Augmentin [Amoxicillin-Pot Clavulanate] Diarrhea    Azithromycin Hives    Crestor [Rosuvastatin] Myalgia    Lescol [Fluvastatin] Myalgia    Micardis [Telmisartan] Unknown (comments)    Morphine Nausea and Vomiting    Prolia [Denosumab] Other (comments)     Dental difficulties     Sulfa (Sulfonamide Antibiotics) Unknown (comments)    Zocor [Simvastatin] Myalgia         Current Outpatient Prescriptions   Medication Sig    buPROPion (WELLBUTRIN) 75 mg tablet Take 1 Tab by mouth two (2) times a day.  carbidopa-levodopa (PARCOPA)  mg rapid dissolve tablet 1 tablet waking, midday, dinnertime    buPROPion SR (WELLBUTRIN SR) 100 mg SR tablet Take 100 mg by mouth two (2) times a day.  metoprolol succinate (TOPROL XL) 25 mg XL tablet Take 25 mg by mouth daily.     famotidine (PEPCID) 20 mg tablet Take 20 mg by mouth two (2) times daily as needed.  L-Methylfolate-U26-Euacaxqvlq (CEREFOLIN) tablet Take 1 Tab by mouth daily.  magnesium hydroxide (PANDA MILK OF MAGNESIA) 400 mg/5 mL suspension Take 30 mL by mouth daily as needed for Constipation.  loperamide (IMODIUM) 2 mg capsule Take 2 mg by mouth four (4) times daily as needed for Diarrhea.  ketoconazole (NIZORAL) 2 % topical cream Apply  to affected area two (2) times daily as needed for Skin Irritation.  hydrocortisone (HYTONE) 2.5 % topical cream Apply  to affected area daily as needed. use thin layer    baicalin-catechin (LIMBREL) 500 mg cap Take 1 Tab by mouth two (2) times a day.  FOLIC ACID/MULTIVIT-MIN/LUTEIN (CENTRUM SILVER PO) Take 1 Tab by mouth daily.  acetaminophen (TYLENOL) 325 mg tablet Take 1,000 mg by mouth every eight (8) hours as needed.  Azelastine (ASTEPRO) 0.15 % (205.5 mcg) nasal spray 1 [place] by Right Nostril route two (2) times daily as needed.  diclofenac (VOLTAREN) 1 % topical gel Apply 4 g to affected area four (4) times daily as needed. Apply to both shoulders    guaiFENesin (MUCINEX) 1,200 mg Ta12 ER tablet Take 600 mg by mouth every twelve (12) hours as needed.  amoxicillin (AMOXIL) 500 mg capsule Take 2,000 mg by mouth as needed. 1 hour prior to dental work     mirabegron ER (MYRBETRIQ) 25 mg ER tablet Take 25 mg by mouth nightly.  clopidogrel (PLAVIX) 75 mg tablet Take 1 Tab by mouth daily.  timolol (TIMOPTIC) 0.5 % ophthalmic solution Administer 1 Drop to left eye daily.  polyethylene glycol (MIRALAX) 17 gram/dose powder Take 17 g by mouth daily as needed.  doxazosin (CARDURA) 1 mg tablet Take 1 mg by mouth daily.  latanoprost (XALATAN) 0.005 % ophthalmic solution Administer 1 Drop to left eye nightly.  nitroglycerin (NITROSTAT) 0.4 mg SL tablet by SubLINGual route every five (5) minutes as needed.      No current facility-administered medications for this visit. Neurologic Exam     Mental Status        WD/WN adult in NAD, normal grooming  VSS  Awake and alert and follows commands. Elderly gentleman who sometimes breaks out in tears when family starts talking about emotional changes with people passing away and people moving. PERRL, nonicteric  Face is symmetric, tongue midline, masklike face  Speech is minimal and soft  No limb ataxia. No abnl movements. Bradykinesia and cogwheel  Moving all extemities spontaneously and symmetric  Gait deferred due to weakness due to debility. He is wheelchair-bound. CVS RRR  Lungs nonlabored  Skin is warm and dry         Visit Vitals    /90    Pulse 76    Resp 20    Ht 5' 6\" (1.676 m)    SpO2 94%       Assessment and Plan   Diagnoses and all orders for this visit:    1. Primary Parkinsonism ([address]  -     REFERRAL TO PHYSICAL THERAPY    2. Debility  -     REFERRAL TO PHYSICAL THERAPY    3. Adjustment disorder with depressed mood    Other orders  -     buPROPion (WELLBUTRIN) 75 mg tablet; Take 1 Tab by mouth two (2) times a day. -     carbidopa-levodopa (PARCOPA)  mg rapid dissolve tablet; 1 tablet waking, midday, dinnertime      80year-old gentleman with Parkinson's who is here to follow-up. There have been a lot of changes since I saw him about 9 months ago. I suspect a lot of these changes are multifactorial.  His Parkinson's overall appears stable but he is having more difficulty with therapy due to weakness and rigidity. I am going to increase carbidopa slightly and asked that he be given the medication when he wakes up, midday, and around dinnertime so that he may benefit from medication before going to bed. He seems to be suffering from an adjustment disorder due to a lot of emotional situations. He is tearful at times. I do not think this is consistent with pseudobulbar.   I am concerned that the Wellbutrin may be too activating for him so I am going to reduce that a little bit and see how he does. He cannot take Zoloft he tells me because he had tried that in the past and had GI issues. I am going to place a formal request for physical therapy to help with his gait. We talked about the possibility of sundowning but because he is having a lot of emotional changes it is difficult to know for sure. We will see how his mood stabilizes. Okay to stop Myrbetriq for incontinence. He uses undergarments full-time now. I had like to remove this medication out of concern of anticholinergic side effects. More than 30 minutes of face-to-face time was spent with the patient and his daughter discussing his condition and the medical changes that have occurred since I saw him last and discussing medication changes were putting forth.         [address] [facility] [address]  Diplomate MICHELLEN

## 2018-09-11 ENCOUNTER — TELEPHONE (OUTPATIENT)
Dept: NEUROLOGY | Age: 83
End: 2018-09-11

## 2018-09-11 NOTE — TELEPHONE ENCOUNTER
Called daughter and lm for her to call back. Need to know how pt is doing on the 75mg before we decrease again.

## 2018-09-11 NOTE — TELEPHONE ENCOUNTER
Pt's daughter already reduced Wellbutrin to 75mg. She want to try 50mg now. If this is okay can you please call the script into the pharmacy. Please leave a detailed message. Please call.

## 2018-09-12 NOTE — TELEPHONE ENCOUNTER
----- Message from Marta Hill sent at 9/12/2018  2:14 PM EDT -----  Regarding: DrObie Spann, daughter, advised request to adjust Wellbutrin Rx is no longer necessary. Best contact (in any questions ) [phone].

## 2019-01-14 ENCOUNTER — OFFICE VISIT (OUTPATIENT)
Dept: NEUROLOGY | Age: 84
End: 2019-01-14

## 2019-01-14 VITALS
OXYGEN SATURATION: 95 % | WEIGHT: 174 LBS | SYSTOLIC BLOOD PRESSURE: 148 MMHG | HEIGHT: 66 IN | RESPIRATION RATE: 18 BRPM | HEART RATE: 75 BPM | DIASTOLIC BLOOD PRESSURE: 82 MMHG | BODY MASS INDEX: 27.97 KG/M2

## 2019-01-14 DIAGNOSIS — R53.81 DEBILITY: ICD-10-CM

## 2019-01-14 DIAGNOSIS — G20 PRIMARY PARKINSONISM (HCC): Primary | ICD-10-CM

## 2019-01-14 DIAGNOSIS — R41.0 INTERMITTENT CONFUSION: ICD-10-CM

## 2019-01-14 RX ORDER — DONEPEZIL HYDROCHLORIDE 5 MG/1
5 TABLET, FILM COATED ORAL
Qty: 90 TAB | Refills: 1 | Status: SHIPPED | OUTPATIENT
Start: 2019-01-14

## 2019-01-14 NOTE — LETTER
1/14/2019 Patient:  Kym Lam YOB: 1927 Date of Visit: 1/14/2019 Dear [place] Siegel MD 
[address] [id number] VIA Facsimile: [phone] 
 : I was requested by Ramon Cortes MD to evaluate Mr. Denise Basilio  for Chief Complaint Patient presents with  Neurologic Problem Parkinson's Chrystal Nicolas I am recommending the following:  
 
Diagnoses and all orders for this visit: 1. Primary Parkinsonism ([address] 2. Debility 3. Intermittent confusion Other orders 
-     donepezil (ARICEPT) 5 mg tablet; Take 1 Tab by mouth daily (with breakfast). ---------------------------------------------------------------------------------------------------------------------- Below is my encounter: Chief Complaint Patient presents with  Neurologic Problem Parkinson's HPI Mr. Beatriz Gonzalez is a 77-year-old gentleman with Parkinson's here to follow-up. Last visit we adjusted his carbidopa dosing schedule which he takes now 3 times a day and there has been overall improvement in his mood and he has had less bradykinesia. He is wheelchair-bound and does not walk. He requires a sling to help him into bed. He is having some fluctuating blood pressures and requires Midrin. Wellbutrin has been reduced to 75 twice daily and he no longer takes Myrbetriq. He still gets easily confused sometimes especially when he is tired. Sometimes he has inappropriate crying following laughter. He is wheelchair-bound. Review of Systems Unable to perform ROS: Mental acuity (Extremely hearing impaired without hearing aids) Past Medical History:  
Diagnosis Date  Arthritis   
 spine, hip  CAD (coronary artery disease) bypass surgery 22 years ago, 1988  Chronic pain   
 right hip  DNR (do not resuscitate) 11/26/2014 11/26/14  GERD (gastroesophageal reflux disease)  Glaucoma  Hard of hearing  Hip arthritis 12/5/2011  
 HTN (hypertension) 9/13/2011  Hyponatremia 11/28/2011  Nonrheumatic aortic valve stenosis 12/10/2017  Osteoporosis  Overactive bladder  Peripheral neuropathy 9/13/2011  Pneumonia  Psychiatric disorder   
 anxiety  Syncope 9/13/2011  Thrombocytopenia ([facility] [address] 11/6/2013  Unspecified adverse effect of anesthesia PT REQUESTS SPINAL ANESTHESIA , TO BE DISCUSSED WITH ANESTHESIOLOGIST DOS Family History Problem Relation Age of Onset  Diabetes Brother  Heart Disease Brother  Heart Disease Father  Stroke Father Social History Socioeconomic History  Marital status:  Spouse name: Not on file  Number of children: Not on file  Years of education: Not on file  Highest education level: Not on file Social Needs  Financial resource strain: Not on file  Food insecurity - worry: Not on file  Food insecurity - inability: Not on file  Transportation needs - medical: Not on file  Transportation needs - non-medical: Not on file Occupational History  Not on file Tobacco Use  Smoking status: Former Smoker  Smokeless tobacco: Never Used Substance and Sexual Activity  Alcohol use: Yes Alcohol/week: 0.5 oz Types: 1 Glasses of wine per week Comment: infrequent  Drug use: No  
 Sexual activity: Not on file Other Topics Concern  Not on file Social History Narrative  Not on file Allergies Allergen Reactions  Latex Rash  Actonel [Risedronate] Unknown (comments)  Augmentin [Amoxicillin-Pot Clavulanate] Diarrhea  Azithromycin Hives  Crestor [Rosuvastatin] Myalgia  Lescol [Fluvastatin] Myalgia  Micardis [Telmisartan] Unknown (comments)  Morphine Nausea and Vomiting  Prolia [Denosumab] Other (comments) Dental difficulties  Sulfa (Sulfonamide Antibiotics) Unknown (comments)  Zocor [Simvastatin] Myalgia Current Outpatient Medications Medication Sig  
 donepezil (ARICEPT) 5 mg tablet Take 1 Tab by mouth daily (with breakfast).  carbidopa-levodopa (PARCOPA)  mg rapid dissolve tablet 1 tablet waking, midday, dinnertime  buPROPion SR (WELLBUTRIN SR) 100 mg SR tablet Take 100 mg by mouth two (2) times a day.  metoprolol succinate (TOPROL XL) 25 mg XL tablet Take 25 mg by mouth daily.  famotidine (PEPCID) 20 mg tablet Take 20 mg by mouth two (2) times daily as needed.  magnesium hydroxide (ClasesD MILK OF MAGNESIA) 400 mg/5 mL suspension Take 30 mL by mouth daily as needed for Constipation.  loperamide (IMODIUM) 2 mg capsule Take 2 mg by mouth four (4) times daily as needed for Diarrhea.  ketoconazole (NIZORAL) 2 % topical cream Apply  to affected area two (2) times daily as needed for Skin Irritation.  FOLIC ACID/MULTIVIT-MIN/LUTEIN (CENTRUM SILVER PO) Take 1 Tab by mouth daily.  acetaminophen (TYLENOL) 325 mg tablet Take 1,000 mg by mouth every eight (8) hours as needed.  diclofenac (VOLTAREN) 1 % topical gel Apply 4 g to affected area four (4) times daily as needed. Apply to both shoulders  guaiFENesin (MUCINEX) 1,200 mg Ta12 ER tablet Take 600 mg by mouth every twelve (12) hours as needed.  amoxicillin (AMOXIL) 500 mg capsule Take 2,000 mg by mouth as needed. 1 hour prior to dental work  mirabegron ER (MYRBETRIQ) 25 mg ER tablet Take 25 mg by mouth nightly.  clopidogrel (PLAVIX) 75 mg tablet Take 1 Tab by mouth daily.  timolol (TIMOPTIC) 0.5 % ophthalmic solution Administer 1 Drop to left eye daily.  polyethylene glycol (MIRALAX) 17 gram/dose powder Take 17 g by mouth daily as needed.  latanoprost (XALATAN) 0.005 % ophthalmic solution Administer 1 Drop to left eye nightly.  buPROPion (WELLBUTRIN) 75 mg tablet Take 1 Tab by mouth two (2) times a day.  L-Methylfolate-T71-Nnsejrzqfd (CEREFOLIN) tablet Take 1 Tab by mouth daily.  hydrocortisone (HYTONE) 2.5 % topical cream Apply  to affected area daily as needed. use thin layer  baicalin-catechin (LIMBREL) 500 mg cap Take 1 Tab by mouth two (2) times a day.  Azelastine (ASTEPRO) 0.15 % (205.5 mcg) nasal spray 1 [place] by Right Nostril route two (2) times daily as needed.  doxazosin (CARDURA) 1 mg tablet Take 1 mg by mouth daily.  nitroglycerin (NITROSTAT) 0.4 mg SL tablet by SubLINGual route every five (5) minutes as needed. No current facility-administered medications for this visit. Neurologic Exam  
 
Mental Status WD/WN adult in NAD, normal grooming VSS 
A&O, very hearing impaired, he does not have his hearing aids today. PERRL, nonicteric, reduced blink rate Face is symmetric, tongue midline, masklike Speech is limited. Soft and hoarse No limb ataxia. No abnl movements. Minimal movement of the extremities. He is flexed at the knees and elbows leaning on his right side. gait deferred as he is wheelchair-bound CVS RRR Lungs nonlabored Skin is warm and dry Visit Vitals /82 Pulse 75 Resp 18 Ht 5' 6\" (1.676 m) Wt 78.9 kg (174 lb) SpO2 95% BMI 28.08 kg/m² Assessment and Plan Diagnoses and all orders for this visit: 1. Primary Parkinsonism ([address] 2. Debility 3. Intermittent confusion Other orders 
-     donepezil (ARICEPT) 5 mg tablet; Take 1 Tab by mouth daily (with breakfast). 80-year-old gentleman with Parkinson's who may be developing a little bit of dementia. I think it would be reasonable to try low-dose donepezil in the morning at breakfast to see if that helps his mood and stabilize his memory. I expect this will likely get worse over time. I think it would be reasonable to add some MCT oil in his coffee in the morning as well. No change to carbidopa. Agree with Midrin for his likely autonomic dysfunction in the setting of Parkinson's. We talked about treating PBA but daughter declines at this time unless it becomes highly dysfunctional. 
I will see him in 6 months. Thank you for giving me the opportunity to assist in the care of Mr. Ila Gay. If you have questions, please do not hesitate to contact me. Sincerely, [address] [facility], DO Neurologist 
Diplomate ROBERTA

## 2019-01-14 NOTE — PROGRESS NOTES
Pt here for f/u. Daughter states laughing/crying at inappropriate times. Depression screen completed.

## 2019-01-14 NOTE — PROGRESS NOTES
Chief Complaint   Patient presents with    Neurologic Problem     Parkinson's       HPI    Mr. Moo Mcclelland is a 29-year-old gentleman with Parkinson's here to follow-up. Last visit we adjusted his carbidopa dosing schedule which he takes now 3 times a day and there has been overall improvement in his mood and he has had less bradykinesia. He is wheelchair-bound and does not walk. He requires a sling to help him into bed. He is having some fluctuating blood pressures and requires Midrin. Wellbutrin has been reduced to 75 twice daily and he no longer takes Myrbetriq. He still gets easily confused sometimes especially when he is tired. Sometimes he has inappropriate crying following laughter. He is wheelchair-bound.         Review of Systems   Unable to perform ROS: Mental acuity (Extremely hearing impaired without hearing aids)       Past Medical History:   Diagnosis Date    Arthritis     spine, hip    CAD (coronary artery disease)     bypass surgery 22 years ago, 1988    Chronic pain     right hip    DNR (do not resuscitate) 11/26/2014 11/26/14    GERD (gastroesophageal reflux disease)     Glaucoma     Hard of hearing     Hip arthritis 12/5/2011    HTN (hypertension) 9/13/2011    Hyponatremia 11/28/2011    Nonrheumatic aortic valve stenosis 12/10/2017    Osteoporosis     Overactive bladder     Peripheral neuropathy 9/13/2011    Pneumonia     Psychiatric disorder     anxiety    Syncope 9/13/2011    Thrombocytopenia ([address] 11/6/2013    Unspecified adverse effect of anesthesia     PT REQUESTS SPINAL ANESTHESIA , TO BE DISCUSSED WITH ANESTHESIOLOGIST DOS     Family History   Problem Relation Age of Onset    Diabetes Brother     Heart Disease Brother     Heart Disease Father     Stroke Father      Social History     Socioeconomic History    Marital status:      Spouse name: Not on file    Number of children: Not on file    Years of education: Not on file    Highest education level: Not on file   Social Needs    Financial resource strain: Not on file    Food insecurity - worry: Not on file    Food insecurity - inability: Not on file   [facility] needs - medical: Not on file   [employer] needs - non-medical: Not on file   Occupational History    Not on file   Tobacco Use    Smoking status: Former Smoker    Smokeless tobacco: Never Used   Substance and Sexual Activity    Alcohol use: Yes     Alcohol/week: 0.5 oz     Types: 1 Glasses of wine per week     Comment: infrequent     Drug use: No    Sexual activity: Not on file   Other Topics Concern    Not on file   Social History Narrative    Not on file     Allergies   Allergen Reactions    Latex Rash    Actonel [Risedronate] Unknown (comments)    Augmentin [Amoxicillin-Pot Clavulanate] Diarrhea    Azithromycin Hives    Crestor [Rosuvastatin] Myalgia    Lescol [Fluvastatin] Myalgia    Micardis [Telmisartan] Unknown (comments)    Morphine Nausea and Vomiting    Prolia [Denosumab] Other (comments)     Dental difficulties     Sulfa (Sulfonamide Antibiotics) Unknown (comments)    Zocor [Simvastatin] Myalgia         Current Outpatient Medications   Medication Sig    donepezil (ARICEPT) 5 mg tablet Take 1 Tab by mouth daily (with breakfast).  carbidopa-levodopa (PARCOPA)  mg rapid dissolve tablet 1 tablet waking, midday, dinnertime    buPROPion SR (WELLBUTRIN SR) 100 mg SR tablet Take 100 mg by mouth two (2) times a day.  metoprolol succinate (TOPROL XL) 25 mg XL tablet Take 25 mg by mouth daily.  famotidine (PEPCID) 20 mg tablet Take 20 mg by mouth two (2) times daily as needed.  magnesium hydroxide (PANDA MILK OF MAGNESIA) 400 mg/5 mL suspension Take 30 mL by mouth daily as needed for Constipation.  loperamide (IMODIUM) 2 mg capsule Take 2 mg by mouth four (4) times daily as needed for Diarrhea.     ketoconazole (NIZORAL) 2 % topical cream Apply  to affected area two (2) times daily as needed for Skin Irritation.  FOLIC ACID/MULTIVIT-MIN/LUTEIN (CENTRUM SILVER PO) Take 1 Tab by mouth daily.  acetaminophen (TYLENOL) 325 mg tablet Take 1,000 mg by mouth every eight (8) hours as needed.  diclofenac (VOLTAREN) 1 % topical gel Apply 4 g to affected area four (4) times daily as needed. Apply to both shoulders    guaiFENesin (MUCINEX) 1,200 mg Ta12 ER tablet Take 600 mg by mouth every twelve (12) hours as needed.  amoxicillin (AMOXIL) 500 mg capsule Take 2,000 mg by mouth as needed. 1 hour prior to dental work     mirabegron ER (MYRBETRIQ) 25 mg ER tablet Take 25 mg by mouth nightly.  clopidogrel (PLAVIX) 75 mg tablet Take 1 Tab by mouth daily.  timolol (TIMOPTIC) 0.5 % ophthalmic solution Administer 1 Drop to left eye daily.  polyethylene glycol (MIRALAX) 17 gram/dose powder Take 17 g by mouth daily as needed.  latanoprost (XALATAN) 0.005 % ophthalmic solution Administer 1 Drop to left eye nightly.  buPROPion (WELLBUTRIN) 75 mg tablet Take 1 Tab by mouth two (2) times a day.  L-Methylfolate-Q42-Aowvifmwks (CEREFOLIN) tablet Take 1 Tab by mouth daily.  hydrocortisone (HYTONE) 2.5 % topical cream Apply  to affected area daily as needed. use thin layer    baicalin-catechin (LIMBREL) 500 mg cap Take 1 Tab by mouth two (2) times a day.  Azelastine (ASTEPRO) 0.15 % (205.5 mcg) nasal spray 1 [place] by Right Nostril route two (2) times daily as needed.  doxazosin (CARDURA) 1 mg tablet Take 1 mg by mouth daily.  nitroglycerin (NITROSTAT) 0.4 mg SL tablet by SubLINGual route every five (5) minutes as needed. No current facility-administered medications for this visit. Neurologic Exam     Mental Status   WD/WN adult in NAD, normal grooming  VSS  A&O, very hearing impaired, he does not have his hearing aids today. PERRL, nonicteric, reduced blink rate  Face is symmetric, tongue midline, masklike  Speech is limited. Soft and hoarse  No limb ataxia.  No abnl movements. Minimal movement of the extremities. He is flexed at the knees and elbows leaning on his right side. gait deferred as he is wheelchair-bound    [facility] RRR  Lungs nonlabored  Skin is warm and dry         Visit Vitals  /82   Pulse 75   Resp 18   Ht 5' 6\" (1.676 m)   Wt 78.9 kg (174 lb)   SpO2 95%   BMI 28.08 kg/m²       Assessment and Plan   Diagnoses and all orders for this visit:    1. Primary Parkinsonism ([address]    2. Debility    3. Intermittent confusion    Other orders  -     donepezil (ARICEPT) 5 mg tablet; Take 1 Tab by mouth daily (with breakfast). 59-year-old gentleman with Parkinson's who may be developing a little bit of dementia. I think it would be reasonable to try low-dose donepezil in the morning at breakfast to see if that helps his mood and stabilize his memory. I expect this will likely get worse over time. I think it would be reasonable to add some MCT oil in his coffee in the morning as well. No change to carbidopa. Agree with Midrin for his likely autonomic dysfunction in the setting of Parkinson's. We talked about treating PBA but daughter declines at this time unless it becomes highly dysfunctional.  I will see him in 6 months.       [address] [facility], [address]  Diplomate ROBERTA

## 2019-01-25 ENCOUNTER — TELEPHONE (OUTPATIENT)
Dept: NEUROLOGY | Age: 84
End: 2019-01-25

## 2019-01-25 NOTE — TELEPHONE ENCOUNTER
Thalia Arteaga calling needing to speak to the nurse about the medication is making pt a \"zombie\"  And very slow.  Please call back

## 2019-01-25 NOTE — TELEPHONE ENCOUNTER
Started Aricept was just as a trial to see if it helped. He was only on it for a few days when I saw him so stopping it is completely fine. Multiple TIAs can cause slow damage to the brain over time. This is not something that we can really stop. He needs to stay healthy and receive good medical care. Stay on his medications. He does not need to go back to Aricept or any other memory medicine if they choose not to. He is taking Plavix daily so that is supposed to help prevent strokes.

## 2019-01-25 NOTE — TELEPHONE ENCOUNTER
Dr. Villafuerte Neighbor, I spoke with patient's daughter and she discontinued patient's Aricept a couple days ago and he is now more alert. She does not want him to start another medication. She would like a phone call stating \"EXACTLY WHAT [address] TIA'S!\". Please advise.

## 2019-02-22 ENCOUNTER — TELEPHONE (OUTPATIENT)
Dept: NEUROLOGY | Age: 84
End: 2019-02-22

## 2019-02-22 NOTE — TELEPHONE ENCOUNTER
Pt's PCP calling re recurrent syncope. Would like to talk about adding a medication. Please call his nurse Evgeny Santizo.

## 2019-02-22 NOTE — TELEPHONE ENCOUNTER
I spoke with Dr Pippa Wolfe nurse Laverne Kumari. This pt keeps passing out whenever he is placed in the bathtub. He starts shaking, turns grey and stares off into space for several minutes before he \"comes too\". Dr Aiden Knight is asking if ok with you if they start him on  Florines 0.1mg daily along with his carbadopa-levodopa?

## 2021-05-17 ENCOUNTER — TELEPHONE (OUTPATIENT)
Dept: NEUROLOGY | Age: 86
End: 2021-05-17

## 2021-05-17 NOTE — TELEPHONE ENCOUNTER
Dr. Sheets Crimes calling re patients Sinemet and possibly increasing. They are concerned about a decline recently. He is requesting Dr. Krystle Meehan call back to discuss.     Mobile number: [phone]

## 2021-05-19 NOTE — TELEPHONE ENCOUNTER
Pt's daughter calling stating she is concerned about possible decline. She said the patient can barely lift his hand to his mouth to feed himself. She is requesting a call back to see if Dr. Angelia Hernandez can see patient sooner than next available.  Please call

## 2021-05-19 NOTE — TELEPHONE ENCOUNTER
Patient's daughter called back, verified, and advised per Dr. Bekah Cortez recommendations.  Patient also scheduled for 08/16/2021 follow up

## 2021-08-16 ENCOUNTER — OFFICE VISIT (OUTPATIENT)
Dept: NEUROLOGY | Age: 86
End: 2021-08-16
Payer: MEDICARE

## 2021-08-16 VITALS
SYSTOLIC BLOOD PRESSURE: 110 MMHG | BODY MASS INDEX: 28.08 KG/M2 | HEART RATE: 69 BPM | OXYGEN SATURATION: 96 % | HEIGHT: 66 IN | DIASTOLIC BLOOD PRESSURE: 70 MMHG

## 2021-08-16 DIAGNOSIS — Z99.3 WHEELCHAIR DEPENDENT: ICD-10-CM

## 2021-08-16 DIAGNOSIS — G20 PRIMARY PARKINSONISM (HCC): Primary | ICD-10-CM

## 2021-08-16 PROCEDURE — G8510 SCR DEP NEG, NO PLAN REQD: HCPCS | Performed by: PSYCHIATRY & NEUROLOGY

## 2021-08-16 PROCEDURE — G8536 NO DOC ELDER MAL SCRN: HCPCS | Performed by: PSYCHIATRY & NEUROLOGY

## 2021-08-16 PROCEDURE — G8419 CALC BMI OUT NRM PARAM NOF/U: HCPCS | Performed by: PSYCHIATRY & NEUROLOGY

## 2021-08-16 PROCEDURE — 99214 OFFICE O/P EST MOD 30 MIN: CPT | Performed by: PSYCHIATRY & NEUROLOGY

## 2021-08-16 PROCEDURE — G8427 DOCREV CUR MEDS BY ELIG CLIN: HCPCS | Performed by: PSYCHIATRY & NEUROLOGY

## 2021-08-16 PROCEDURE — 1101F PT FALLS ASSESS-DOCD LE1/YR: CPT | Performed by: PSYCHIATRY & NEUROLOGY

## 2021-08-16 RX ORDER — CARBIDOPA AND LEVODOPA 25; 100 MG/1; MG/1
TABLET, ORALLY DISINTEGRATING ORAL
Qty: 180 TABLET | Refills: 6 | Status: SHIPPED | OUTPATIENT
Start: 2021-08-16

## 2021-08-16 NOTE — PROGRESS NOTES
Chief Complaint   Patient presents with    Follow-up    Parkinsons Disease       HPI    72-year-old gentleman I have not seen since January 2019. He is here with his daughter. His other daughter Percy Gilbert I also spoke to on the phone personally. He has been seeing me for Parkinson's disease. He is wheelchair-bound. He lives at [facility] receiving full-time care 24/7. Since I saw him last he has slowly declined. He is very rigid now. He receives carbidopa low-dose 3 times daily to in the morning, 1 midday, 2 in the evening. There is some issues with timing of medication as it seems he takes it after meals. Daughter reports that it does help having the medication on board as far as the ability to move his limbs better with eating. No hallucinations. Minimal occasional drooling. He is on Wellbutrin.             Review of Systems   Unable to perform ROS: Age       Past Medical History:   Diagnosis Date    Arthritis     spine, hip    CAD (coronary artery disease)     bypass surgery 22 years ago, 1988    Chronic pain     right hip    DNR (do not resuscitate) 11/26/2014 11/26/14    GERD (gastroesophageal reflux disease)     Glaucoma     Hard of hearing     Hip arthritis 12/5/2011    HTN (hypertension) 9/13/2011    Hyponatremia 11/28/2011    Nonrheumatic aortic valve stenosis 12/10/2017    Osteoporosis     Overactive bladder     Peripheral neuropathy 9/13/2011    Pneumonia     Psychiatric disorder     anxiety    Syncope 9/13/2011    Thrombocytopenia ([facility] [facility] 11/6/2013    Unspecified adverse effect of anesthesia     PT REQUESTS SPINAL ANESTHESIA , TO BE DISCUSSED WITH ANESTHESIOLOGIST DOS     Family History   Problem Relation Age of Onset    Diabetes Brother     Heart Disease Brother     Heart Disease Father     Stroke Father      Social History     Socioeconomic History    Marital status:      Spouse name: Not on file    Number of children: Not on file    Years of education: Not on file    Highest education level: Not on file   Occupational History    Not on file   Tobacco Use    Smoking status: Former Smoker    Smokeless tobacco: Never Used   Vaping Use    Vaping Use: Never used   Substance and Sexual Activity    Alcohol use: Yes     Alcohol/week: 0.8 standard drinks     Types: 1 Glasses of wine per week     Comment: infrequent     Drug use: No    Sexual activity: Not on file   Other Topics Concern    Not on file   Social History Narrative    Not on file     Social Determinants of Health     Financial Resource Strain:     Difficulty of Paying Living Expenses:    Food Insecurity:     Worried About Running Out of Food in the Last Year:     [address] in the Last Year:    Transportation Needs:     Lack of Transportation (Medical):  Lack of Transportation (Non-Medical):    Physical Activity:     Days of Exercise per Week:     Minutes of Exercise per Session:    Stress:     Feeling of Stress :    Social Connections:     Frequency of Communication with Friends and Family:     Frequency of Social Gatherings with Friends and Family:     Attends [employer] Services:     Active Member of Clubs or Organizations:     Attends Club or Organization Meetings:     Marital Status:    Intimate Partner Violence:     Fear of Current or Ex-Partner:     Emotionally Abused:     Physically Abused:     Sexually Abused:       Allergies   Allergen Reactions    Latex Rash    Actonel [Risedronate] Unknown (comments)    Augmentin [Amoxicillin-Pot Clavulanate] Diarrhea    Azithromycin Hives    Crestor [Rosuvastatin] Myalgia    Lescol [Fluvastatin] Myalgia    Micardis [Telmisartan] Unknown (comments)    Morphine Nausea and Vomiting    Prolia [Denosumab] Other (comments)     Dental difficulties     Sulfa (Sulfonamide Antibiotics) Unknown (comments)    Zocor [Simvastatin] Myalgia         Current Outpatient Medications   Medication Sig    carbidopa-levodopa (PARCOPA)  mg rapid dissolve tablet 2 tabs 3 times daily: 0800, 1100, 1600    donepezil (ARICEPT) 5 mg tablet Take 1 Tab by mouth daily (with breakfast).  buPROPion (WELLBUTRIN) 75 mg tablet Take 1 Tab by mouth two (2) times a day.  buPROPion SR (WELLBUTRIN SR) 100 mg SR tablet Take 100 mg by mouth two (2) times a day.  metoprolol succinate (TOPROL XL) 25 mg XL tablet Take 25 mg by mouth daily.  famotidine (PEPCID) 20 mg tablet Take 20 mg by mouth two (2) times daily as needed.  L-Methylfolate-J03-Izzwnnjctq (CEREFOLIN) tablet Take 1 Tab by mouth daily.  magnesium hydroxide (PANDA MILK OF MAGNESIA) 400 mg/5 mL suspension Take 30 mL by mouth daily as needed for Constipation.  loperamide (IMODIUM) 2 mg capsule Take 2 mg by mouth four (4) times daily as needed for Diarrhea.  ketoconazole (NIZORAL) 2 % topical cream Apply  to affected area two (2) times daily as needed for Skin Irritation.  hydrocortisone (HYTONE) 2.5 % topical cream Apply  to affected area daily as needed. use thin layer    baicalin-catechin (LIMBREL) 500 mg cap Take 1 Tab by mouth two (2) times a day.  FOLIC ACID/MULTIVIT-MIN/LUTEIN (CENTRUM SILVER PO) Take 1 Tab by mouth daily.  acetaminophen (TYLENOL) 325 mg tablet Take 1,000 mg by mouth every eight (8) hours as needed.  Azelastine (ASTEPRO) 0.15 % (205.5 mcg) nasal spray 1 [place] by Right Nostril route two (2) times daily as needed.  diclofenac (VOLTAREN) 1 % topical gel Apply 4 g to affected area four (4) times daily as needed. Apply to both shoulders    guaiFENesin (MUCINEX) 1,200 mg Ta12 ER tablet Take 600 mg by mouth every twelve (12) hours as needed.  mirabegron ER (MYRBETRIQ) 25 mg ER tablet Take 25 mg by mouth nightly.  clopidogrel (PLAVIX) 75 mg tablet Take 1 Tab by mouth daily.  timolol (TIMOPTIC) 0.5 % ophthalmic solution Administer 1 Drop to left eye daily.  polyethylene glycol (MIRALAX) 17 gram/dose powder Take 17 g by mouth daily as needed.     doxazosin (CARDURA) 1 mg tablet Take 1 mg by mouth daily.  latanoprost (XALATAN) 0.005 % ophthalmic solution Administer 1 Drop to left eye nightly.  nitroglycerin (NITROSTAT) 0.4 mg SL tablet by SubLINGual route every five (5) minutes as needed.  amoxicillin (AMOXIL) 500 mg capsule Take 2,000 mg by mouth as needed. 1 hour prior to dental work  (Patient not taking: Reported on 8/16/2021)     No current facility-administered medications for this visit. Neurologic Exam     Mental Status   WD/WN adult in NAD, normal grooming  VSS  A&O good eye contact. Extremely hearing impaired. PERRL, nonicteric, reduced blink rate  Face is very masklike but grossly symmetric,  Speech is limited to single words and extremely hoarse and soft  No limb ataxia. No abnl movements. No resting tremor or dyskinesias appreciated  Gait deferred             Visit Vitals  /70   Pulse 69   Ht 5' 6\" (1.676 m)   SpO2 96%   BMI 28.08 kg/m²       Assessment and Plan   Diagnoses and all orders for this visit:    1. Primary parkinsonism ([address]    2. Wheelchair dependent    Other orders  -     carbidopa-levodopa (PARCOPA)  mg rapid dissolve tablet; 2 tabs 3 times daily: 0800, 1100, 120      80year-old gentleman with slowly advancing Parkinson's disease. He remains essentially wheelchair-bound. He has a lot more rigidity that I expect with time. It does sound like he is still tolerating p.o. fine and not having any choking spells. It does take him a lot longer to feed himself however as expected. We are going to manipulate the timing of dosing carbidopa so that he could benefit from it better so we will try 8 AM, 11 AM, 4 PM that way he could benefit during his meals as far as feeding himself. I am also going to recommend a course of PT, OT, speech. This may help manage some of the increased tone he has. I am not hearing anything serious like hallucinations. He sleeps well.   We just follow for now and watch for worsening end-stage signs and symptoms such as dysphagia, hallucinations, etc.  I briefly discussed with the family that if he gets to the point where he does not want to eat or drink or cannot eat or drink the option would be considering a PEG tube or letting nature take its course. It sounds like advanced directives are already in place and that he would not want artificial means of living. I would recommend following up in about 6-9 months so we can reassess and consider repeat therapies as needed. We can adjust medication as needed. 30 minutes of time was spent in total today reviewing the medical record as well as face-to-face time with the patient, face-to-face with one of his daughters and on the phone with a second daughter, completion of documentation. I reviewed and decided to continue the current medications. This clinical note was dictated with an electronic dictation software that can make unintentional errors. If there are any questions, please contact me directly for clarification.       [address] [facility], [facility] [address]  Diplomate ROBERTA

## 2021-08-16 NOTE — LETTER
8/16/2021    Patient: Venecia Montalvo III   YOB: 1927   Date of Visit: 8/16/2021     Tracy Terrell MD  [address] [place] [id number]  Via Fax: [phone]    Dear Tracy Terrell MD,      Thank you for referring Mr. Noelle Diaz to [address] [place] [address] for evaluation. My notes for this consultation are attached. If you have questions, please do not hesitate to call me. I look forward to following your patient along with you. Sincerely,    Claribel Falcon, DO    8/16/2021     Patient:  Venecia Montalvo III   YOB: 1927  Date of Visit: 8/16/2021      Dear Tracy Terrell MD  [address] [place] [id number]  Via Fax: [phone]: I was requested by Jc Puentes MD to evaluate Mr. Noelle Diaz  for   Chief Complaint   Patient presents with    Follow-up    Parkinsons Disease   . I am recommending the following:     Diagnoses and all orders for this visit:    1. Primary parkinsonism ([facility] [address]    2. Wheelchair dependent    Other orders  -     carbidopa-levodopa (PARCOPA)  mg rapid dissolve tablet; 2 tabs 3 times daily: 0800, 1100, 1600        ----------------------------------------------------------------------------------------------------------------------  Below is my encounter:  Chief Complaint   Patient presents with    Follow-up    Parkinsons Disease       HPI    80year-old gentleman I have not seen since January 2019. He is here with his daughter. His other daughter Rhett Mitchell I also spoke to on the phone personally. He has been seeing me for Parkinson's disease. He is wheelchair-bound. He lives at [facility] receiving full-time care 24/7. Since I saw him last he has slowly declined. He is very rigid now. He receives carbidopa low-dose 3 times daily to in the morning, 1 midday, 2 in the evening.   There is some issues with timing of medication as it seems he takes it after meals. Daughter reports that it does help having the medication on board as far as the ability to move his limbs better with eating. No hallucinations. Minimal occasional drooling. He is on Wellbutrin. Review of Systems   Unable to perform ROS: Age       Past Medical History:   Diagnosis Date    Arthritis     spine, hip    CAD (coronary artery disease)     bypass surgery 22 years ago, 1988    Chronic pain     right hip    DNR (do not resuscitate) 11/26/2014 11/26/14    GERD (gastroesophageal reflux disease)     Glaucoma     Hard of hearing     Hip arthritis 12/5/2011    HTN (hypertension) 9/13/2011    Hyponatremia 11/28/2011    Nonrheumatic aortic valve stenosis 12/10/2017    Osteoporosis     Overactive bladder     Peripheral neuropathy 9/13/2011    Pneumonia     Psychiatric disorder     anxiety    Syncope 9/13/2011    Thrombocytopenia ([address] 11/6/2013    Unspecified adverse effect of anesthesia     PT REQUESTS SPINAL ANESTHESIA , TO BE DISCUSSED WITH ANESTHESIOLOGIST DOS     Family History   Problem Relation Age of Onset    Diabetes Brother     Heart Disease Brother     Heart Disease Father     Stroke Father      Social History     Socioeconomic History    Marital status:      Spouse name: Not on file    Number of children: Not on file    Years of education: Not on file    Highest education level: Not on file   Occupational History    Not on file   Tobacco Use    Smoking status: Former Smoker    Smokeless tobacco: Never Used   Vaping Use    Vaping Use: Never used   Substance and Sexual Activity    Alcohol use:  Yes     Alcohol/week: 0.8 standard drinks     Types: 1 Glasses of wine per week     Comment: infrequent     Drug use: No    Sexual activity: Not on file   Other Topics Concern    Not on file   Social History Narrative    Not on file     Social Determinants of Health     Financial Resource Strain:     Difficulty of Paying Living Expenses: Food Insecurity:     Worried About Running Out of Food in the Last Year:     [address] in the Last Year:    Transportation Needs:     Lack of Transportation (Medical):  Lack of Transportation (Non-Medical):    Physical Activity:     Days of Exercise per Week:     Minutes of Exercise per Session:    Stress:     Feeling of Stress :    Social Connections:     Frequency of Communication with Friends and Family:     Frequency of Social Gatherings with Friends and Family:     Attends [employer] Services:     Active Member of Clubs or Organizations:     Attends Club or Organization Meetings:     Marital Status:    Intimate Partner Violence:     Fear of Current or Ex-Partner:     Emotionally Abused:     Physically Abused:     Sexually Abused: Allergies   Allergen Reactions    Latex Rash    Actonel [Risedronate] Unknown (comments)    Augmentin [Amoxicillin-Pot Clavulanate] Diarrhea    Azithromycin Hives    Crestor [Rosuvastatin] Myalgia    Lescol [Fluvastatin] Myalgia    Micardis [Telmisartan] Unknown (comments)    Morphine Nausea and Vomiting    Prolia [Denosumab] Other (comments)     Dental difficulties     Sulfa (Sulfonamide Antibiotics) Unknown (comments)    Zocor [Simvastatin] Myalgia         Current Outpatient Medications   Medication Sig    carbidopa-levodopa (PARCOPA)  mg rapid dissolve tablet 2 tabs 3 times daily: 0800, 1100, 1600    donepezil (ARICEPT) 5 mg tablet Take 1 Tab by mouth daily (with breakfast).  buPROPion (WELLBUTRIN) 75 mg tablet Take 1 Tab by mouth two (2) times a day.  buPROPion SR (WELLBUTRIN SR) 100 mg SR tablet Take 100 mg by mouth two (2) times a day.  metoprolol succinate (TOPROL XL) 25 mg XL tablet Take 25 mg by mouth daily.  famotidine (PEPCID) 20 mg tablet Take 20 mg by mouth two (2) times daily as needed.  L-Methylfolate-O48-Fcoharrcbw (CEREFOLIN) tablet Take 1 Tab by mouth daily.     magnesium hydroxide (PANDA MILK OF MAGNESIA) 400 mg/5 mL suspension Take 30 mL by mouth daily as needed for Constipation.  loperamide (IMODIUM) 2 mg capsule Take 2 mg by mouth four (4) times daily as needed for Diarrhea.  ketoconazole (NIZORAL) 2 % topical cream Apply  to affected area two (2) times daily as needed for Skin Irritation.  hydrocortisone (HYTONE) 2.5 % topical cream Apply  to affected area daily as needed. use thin layer    baicalin-catechin (LIMBREL) 500 mg cap Take 1 Tab by mouth two (2) times a day.  FOLIC ACID/MULTIVIT-MIN/LUTEIN (CENTRUM SILVER PO) Take 1 Tab by mouth daily.  acetaminophen (TYLENOL) 325 mg tablet Take 1,000 mg by mouth every eight (8) hours as needed.  Azelastine (ASTEPRO) 0.15 % (205.5 mcg) nasal spray 1 Palisade by Right Nostril route two (2) times daily as needed.  diclofenac (VOLTAREN) 1 % topical gel Apply 4 g to affected area four (4) times daily as needed. Apply to both shoulders    guaiFENesin (MUCINEX) 1,200 mg Ta12 ER tablet Take 600 mg by mouth every twelve (12) hours as needed.  mirabegron ER (MYRBETRIQ) 25 mg ER tablet Take 25 mg by mouth nightly.  clopidogrel (PLAVIX) 75 mg tablet Take 1 Tab by mouth daily.  timolol (TIMOPTIC) 0.5 % ophthalmic solution Administer 1 Drop to left eye daily.  polyethylene glycol (MIRALAX) 17 gram/dose powder Take 17 g by mouth daily as needed.  doxazosin (CARDURA) 1 mg tablet Take 1 mg by mouth daily.  latanoprost (XALATAN) 0.005 % ophthalmic solution Administer 1 Drop to left eye nightly.  nitroglycerin (NITROSTAT) 0.4 mg SL tablet by SubLINGual route every five (5) minutes as needed.  amoxicillin (AMOXIL) 500 mg capsule Take 2,000 mg by mouth as needed. 1 hour prior to dental work  (Patient not taking: Reported on 8/16/2021)     No current facility-administered medications for this visit. Neurologic Exam     Mental Status   WD/WN adult in NAD, normal grooming  VSS  A&O good eye contact.   Extremely hearing impaired. PERRL, nonicteric, reduced blink rate  Face is very masklike but grossly symmetric,  Speech is limited to single words and extremely hoarse and soft  No limb ataxia. No abnl movements. No resting tremor or dyskinesias appreciated  Gait deferred             Visit Vitals  /70   Pulse 69   Ht 5' 6\" (1.676 m)   SpO2 96%   BMI 28.08 kg/m²       Assessment and Plan   Diagnoses and all orders for this visit:    1. Primary parkinsonism ([address]    2. Wheelchair dependent    Other orders  -     carbidopa-levodopa (PARCOPA)  mg rapid dissolve tablet; 2 tabs 3 times daily: 0800, 1100, 120      80year-old gentleman with slowly advancing Parkinson's disease. He remains essentially wheelchair-bound. He has a lot more rigidity that I expect with time. It does sound like he is still tolerating p.o. fine and not having any choking spells. It does take him a lot longer to feed himself however as expected. We are going to manipulate the timing of dosing carbidopa so that he could benefit from it better so we will try 8 AM, 11 AM, 4 PM that way he could benefit during his meals as far as feeding himself. I am also going to recommend a course of PT, OT, speech. This may help manage some of the increased tone he has. I am not hearing anything serious like hallucinations. He sleeps well. We just follow for now and watch for worsening end-stage signs and symptoms such as dysphagia, hallucinations, etc.  I briefly discussed with the family that if he gets to the point where he does not want to eat or drink or cannot eat or drink the option would be considering a PEG tube or letting nature take its course. It sounds like advanced directives are already in place and that he would not want artificial means of living. I would recommend following up in about 6-9 months so we can reassess and consider repeat therapies as needed. We can adjust medication as needed.   30 minutes of time was spent in total today reviewing the medical record as well as face-to-face time with the patient, face-to-face with one of his daughters and on the phone with a second daughter, completion of documentation. I reviewed and decided to continue the current medications. This clinical note was dictated with an electronic dictation software that can make unintentional errors. If there are any questions, please contact me directly for clarification. Thank you for giving me the opportunity to assist in the care of Mr. Chet Bautista. If you have questions, please do not hesitate to contact me.         Sincerely,      [facility], DO  Neurologist  Brain Injury Medicine  Diplomate ABPN

## 2021-09-15 ENCOUNTER — TELEPHONE (OUTPATIENT)
Dept: NEUROLOGY | Age: 86
End: 2021-09-15

## 2021-09-15 NOTE — TELEPHONE ENCOUNTER
Called and LVM for the patient's daughter to call back with her concerns or [facility]

## 2021-09-23 ENCOUNTER — TELEPHONE (OUTPATIENT)
Dept: NEUROLOGY | Age: 86
End: 2021-09-23

## 2021-09-23 DIAGNOSIS — G20 PRIMARY PARKINSONISM (HCC): Primary | ICD-10-CM

## 2021-09-23 NOTE — TELEPHONE ENCOUNTER
Patient's daughter called, advised of Dr. Mariah Rosen response and she requests a print out to  later today. Telephone encounter also printed out and faxed to Dr. Gini Nguyen at [employer] at [phone].

## 2021-09-23 NOTE — TELEPHONE ENCOUNTER
Okay, I looked at the letter written by his daughter. I also reevaluated his medications and overall situation based on our last appointment. Certainly increasing the medications would help with his mobility for the most part. It is possible it could contribute to mental status changes however that is typically not the case with this dose of medication as I see that with much higher doses. I am concerned that he is continuing to progress unfortunately advancing into late stage Parkinson's which tends to also develop dementia and memory disturbances. When I saw him last he is essentially wheelchair-bound for the most part suggesting clear worsening of disease. We certainly can reduce the medication however his mobility will be affected and he will be more rigid. I recommend stressing on strict routine. Optimize sleep is much as possible because if he does not sleep well he will definitely have more difficult days cognitively and physically. I can write an order for PT and OT to do where he lives. If his condition is such that it is difficult to participate I am not sure how much it will help, but certainly worth attempting. As far as the booster shot, he certainly is a candidate for receiving the third vaccination. He is kept in a very controlled environment which hopefully lowers his exposure risk. I have no restrictions on a booster for the Covid vaccine as long as he tolerated the first series fine. As his condition progresses, it will need to be discussed with his physician whether or not hospice would be appropriate. Can try to bring him in sooner before the end of the year for another evaluation schedule permitting and also dependent on his facility as I am sure they are restricting movement in and out of the location.

## 2021-09-23 NOTE — TELEPHONE ENCOUNTER
----- Message from Tanja Joseph sent at 9/23/2021  3:59 PM EDT -----  Regarding: Dr. Ginny Lama Message/Vendor Calls    Caller's first and last name: Katelin Stephen (Daughter)      Reason for call: Therapy Request       Callback required yes/no and why: Yes Therapy needed      Best contact number(s): [phone]      Details to clarify the request: The patient is wanting to schedule therapy for her father. [id number] Please set up therapy. Thank you.        Tanja Joseph   General Message/Vendor Calls

## 2021-12-07 ENCOUNTER — TELEPHONE (OUTPATIENT)
Dept: NEUROLOGY | Age: 86
End: 2021-12-07

## 2021-12-07 NOTE — TELEPHONE ENCOUNTER
Dr. Alec Craft would like to discuss patient's diagnosis/condition with  at the earlier convenience.

## 2021-12-08 NOTE — TELEPHONE ENCOUNTER
I think is best to set up an appointment to talk on the phone so I can have the chart available. If Dr. Alec Craft would like to talk, we can do so Tuesday at [phone]. If agreed, send me his number and I will put it on my calendar.

## 2021-12-13 NOTE — TELEPHONE ENCOUNTER
Called and LVM for Dr. Drew Epley on his cell providing Dr. Lauern Ortiz cell number and requesting a call back tomorrow Tuesday 12/14/2021 at 12:15pm to discuss further.

## 2022-03-18 PROBLEM — G20 PRIMARY PARKINSONISM (HCC): Status: ACTIVE | Noted: 2017-12-07

## 2022-03-19 PROBLEM — I35.0 NONRHEUMATIC AORTIC VALVE STENOSIS: Status: ACTIVE | Noted: 2017-12-10

## 2022-03-19 PROBLEM — R41.82 ALTERED MENTAL STATUS: Status: ACTIVE | Noted: 2017-12-07

## 2022-03-20 PROBLEM — R55 SYNCOPE: Status: ACTIVE | Noted: 2017-12-08

## 2022-03-20 PROBLEM — R41.89 UNRESPONSIVE EPISODE: Status: ACTIVE | Noted: 2017-12-07

## 2025-03-06 NOTE — PROGRESS NOTES
Bedside shift change report given to MIRIAN Miller (oncoming nurse) by Lise Nash (offgoing nurse). Report included the following information SBAR, Kardex, Intake/Output, MAR, Recent Results and Cardiac Rhythm NSR. : Yes